# Patient Record
Sex: FEMALE | Race: WHITE | HISPANIC OR LATINO | Employment: FULL TIME | URBAN - METROPOLITAN AREA
[De-identification: names, ages, dates, MRNs, and addresses within clinical notes are randomized per-mention and may not be internally consistent; named-entity substitution may affect disease eponyms.]

---

## 2017-01-05 ENCOUNTER — ALLSCRIPTS OFFICE VISIT (OUTPATIENT)
Dept: OTHER | Facility: OTHER | Age: 52
End: 2017-01-05

## 2017-02-08 ENCOUNTER — TRANSCRIBE ORDERS (OUTPATIENT)
Dept: ADMINISTRATIVE | Facility: HOSPITAL | Age: 52
End: 2017-02-08

## 2017-02-08 DIAGNOSIS — Z12.31 SCREENING MAMMOGRAM FOR HIGH-RISK PATIENT: Primary | ICD-10-CM

## 2017-02-16 ENCOUNTER — ALLSCRIPTS OFFICE VISIT (OUTPATIENT)
Dept: OTHER | Facility: OTHER | Age: 52
End: 2017-02-16

## 2017-02-16 DIAGNOSIS — M75.101 RIGHT ROTATOR CUFF TEAR: ICD-10-CM

## 2017-02-16 DIAGNOSIS — M75.111 INCOMPLETE TEAR OF RIGHT ROTATOR CUFF: ICD-10-CM

## 2017-02-16 DIAGNOSIS — M25.511 PAIN IN RIGHT SHOULDER: ICD-10-CM

## 2017-02-21 ENCOUNTER — TRANSCRIBE ORDERS (OUTPATIENT)
Dept: ADMINISTRATIVE | Facility: HOSPITAL | Age: 52
End: 2017-02-21

## 2017-02-21 DIAGNOSIS — M75.101 TEAR OF RIGHT ROTATOR CUFF, UNSPECIFIED TEAR EXTENT: Primary | ICD-10-CM

## 2017-02-24 ENCOUNTER — ALLSCRIPTS OFFICE VISIT (OUTPATIENT)
Dept: OTHER | Facility: OTHER | Age: 52
End: 2017-02-24

## 2017-02-24 ENCOUNTER — HOSPITAL ENCOUNTER (OUTPATIENT)
Dept: RADIOLOGY | Age: 52
Discharge: HOME | End: 2017-02-24
Payer: COMMERCIAL

## 2017-02-24 DIAGNOSIS — M75.101 RIGHT ROTATOR CUFF TEAR: ICD-10-CM

## 2017-02-24 PROCEDURE — 73221 MRI JOINT UPR EXTREM W/O DYE: CPT

## 2017-03-06 ENCOUNTER — ALLSCRIPTS OFFICE VISIT (OUTPATIENT)
Dept: OTHER | Facility: OTHER | Age: 52
End: 2017-03-06

## 2017-03-12 ENCOUNTER — HOSPITAL ENCOUNTER (EMERGENCY)
Facility: HOSPITAL | Age: 52
Discharge: HOME | End: 2017-03-12
Attending: EMERGENCY MEDICINE | Admitting: EMERGENCY MEDICINE
Payer: COMMERCIAL

## 2017-03-12 VITALS
BODY MASS INDEX: 36.17 KG/M2 | SYSTOLIC BLOOD PRESSURE: 126 MMHG | OXYGEN SATURATION: 99 % | HEART RATE: 76 BPM | DIASTOLIC BLOOD PRESSURE: 72 MMHG | WEIGHT: 185.19 LBS | RESPIRATION RATE: 20 BRPM | TEMPERATURE: 97.2 F

## 2017-03-12 DIAGNOSIS — G43.909 MIGRAINE: Primary | ICD-10-CM

## 2017-03-12 PROCEDURE — 96375 TX/PRO/DX INJ NEW DRUG ADDON: CPT

## 2017-03-12 PROCEDURE — 96361 HYDRATE IV INFUSION ADD-ON: CPT

## 2017-03-12 PROCEDURE — 99283 EMERGENCY DEPT VISIT LOW MDM: CPT

## 2017-03-12 PROCEDURE — 96374 THER/PROPH/DIAG INJ IV PUSH: CPT

## 2017-03-12 RX ORDER — KETOROLAC TROMETHAMINE 30 MG/ML
30 INJECTION, SOLUTION INTRAMUSCULAR; INTRAVENOUS ONCE
Status: COMPLETED | OUTPATIENT
Start: 2017-03-12 | End: 2017-03-12

## 2017-03-12 RX ORDER — DIPHENHYDRAMINE HYDROCHLORIDE 50 MG/ML
25 INJECTION INTRAMUSCULAR; INTRAVENOUS ONCE
Status: COMPLETED | OUTPATIENT
Start: 2017-03-12 | End: 2017-03-12

## 2017-03-12 RX ORDER — METOCLOPRAMIDE 10 MG/1
10 TABLET ORAL 4 TIMES DAILY PRN
Qty: 20 TABLET | Refills: 0 | Status: SHIPPED | OUTPATIENT
Start: 2017-03-12 | End: 2017-03-17

## 2017-03-12 RX ORDER — METOCLOPRAMIDE HYDROCHLORIDE 5 MG/ML
10 INJECTION INTRAMUSCULAR; INTRAVENOUS ONCE
Status: COMPLETED | OUTPATIENT
Start: 2017-03-12 | End: 2017-03-12

## 2017-03-12 RX ORDER — TOPIRAMATE 50 MG/1
50 TABLET, FILM COATED ORAL 2 TIMES DAILY
COMMUNITY
End: 2017-09-23 | Stop reason: ALTCHOICE

## 2017-03-12 RX ADMIN — METOCLOPRAMIDE 10 MG: 5 INJECTION, SOLUTION INTRAMUSCULAR; INTRAVENOUS at 11:31

## 2017-03-12 RX ADMIN — DIPHENHYDRAMINE HYDROCHLORIDE 25 MG: 50 INJECTION, SOLUTION INTRAMUSCULAR; INTRAVENOUS at 11:33

## 2017-03-12 RX ADMIN — SODIUM CHLORIDE 1000 ML: 0.9 INJECTION, SOLUTION INTRAVENOUS at 11:30

## 2017-03-12 RX ADMIN — KETOROLAC TROMETHAMINE 30 MG: 30 INJECTION, SOLUTION INTRAMUSCULAR at 11:32

## 2017-03-29 ENCOUNTER — ALLSCRIPTS OFFICE VISIT (OUTPATIENT)
Dept: OTHER | Facility: OTHER | Age: 52
End: 2017-03-29

## 2017-03-31 ENCOUNTER — GENERIC CONVERSION - ENCOUNTER (OUTPATIENT)
Dept: OTHER | Facility: OTHER | Age: 52
End: 2017-03-31

## 2017-04-18 ENCOUNTER — TRANSCRIBE ORDERS (OUTPATIENT)
Dept: ADMINISTRATIVE | Facility: HOSPITAL | Age: 52
End: 2017-04-18

## 2017-04-18 DIAGNOSIS — J01.91 ACUTE RECURRENT SINUSITIS, UNSPECIFIED LOCATION: Primary | ICD-10-CM

## 2017-04-28 ENCOUNTER — HOSPITAL ENCOUNTER (OUTPATIENT)
Dept: RADIOLOGY | Facility: HOSPITAL | Age: 52
Discharge: HOME | End: 2017-04-28
Attending: OTOLARYNGOLOGY
Payer: COMMERCIAL

## 2017-04-28 DIAGNOSIS — J01.91 ACUTE RECURRENT SINUSITIS, UNSPECIFIED LOCATION: ICD-10-CM

## 2017-04-28 PROCEDURE — 70486 CT MAXILLOFACIAL W/O DYE: CPT

## 2017-05-01 ENCOUNTER — GENERIC CONVERSION - ENCOUNTER (OUTPATIENT)
Dept: OTHER | Facility: OTHER | Age: 52
End: 2017-05-01

## 2017-05-01 ENCOUNTER — HOSPITAL ENCOUNTER (OUTPATIENT)
Dept: MAMMOGRAPHY | Facility: CLINIC | Age: 52
Discharge: HOME | End: 2017-05-01
Payer: COMMERCIAL

## 2017-05-01 ENCOUNTER — HOSPITAL ENCOUNTER (OUTPATIENT)
Dept: ULTRASOUND IMAGING | Facility: CLINIC | Age: 52
Discharge: HOME | End: 2017-05-01
Payer: COMMERCIAL

## 2017-05-01 DIAGNOSIS — R92.8 OTHER ABNORMAL AND INCONCLUSIVE FINDINGS ON DIAGNOSTIC IMAGING OF BREAST: ICD-10-CM

## 2017-05-01 DIAGNOSIS — Z12.31 ENCOUNTER FOR SCREENING MAMMOGRAM FOR MALIGNANT NEOPLASM OF BREAST: ICD-10-CM

## 2017-05-01 PROCEDURE — G0202 SCR MAMMO BI INCL CAD: HCPCS

## 2017-05-02 ENCOUNTER — GENERIC CONVERSION - ENCOUNTER (OUTPATIENT)
Dept: OTHER | Facility: OTHER | Age: 52
End: 2017-05-02

## 2017-05-09 ENCOUNTER — GENERIC CONVERSION - ENCOUNTER (OUTPATIENT)
Dept: OTHER | Facility: OTHER | Age: 52
End: 2017-05-09

## 2017-05-17 ENCOUNTER — ALLSCRIPTS OFFICE VISIT (OUTPATIENT)
Dept: OTHER | Facility: OTHER | Age: 52
End: 2017-05-17

## 2017-05-17 DIAGNOSIS — Z13.1 ENCOUNTER FOR SCREENING FOR DIABETES MELLITUS: ICD-10-CM

## 2017-05-17 DIAGNOSIS — M79.605 PAIN OF LEFT LEG: ICD-10-CM

## 2017-05-17 DIAGNOSIS — Z13.220 ENCOUNTER FOR SCREENING FOR LIPOID DISORDERS: ICD-10-CM

## 2017-05-18 ENCOUNTER — TRANSCRIBE ORDERS (OUTPATIENT)
Dept: ADMINISTRATIVE | Facility: HOSPITAL | Age: 52
End: 2017-05-18

## 2017-05-18 ENCOUNTER — HOSPITAL ENCOUNTER (OUTPATIENT)
Dept: ULTRASOUND IMAGING | Facility: HOSPITAL | Age: 52
Discharge: HOME | End: 2017-05-18
Payer: COMMERCIAL

## 2017-05-18 ENCOUNTER — GENERIC CONVERSION - ENCOUNTER (OUTPATIENT)
Dept: OTHER | Facility: OTHER | Age: 52
End: 2017-05-18

## 2017-05-18 DIAGNOSIS — M79.605 PAIN OF LEFT LEG: ICD-10-CM

## 2017-05-18 PROCEDURE — 93971 EXTREMITY STUDY: CPT

## 2017-06-03 ENCOUNTER — APPOINTMENT (OUTPATIENT)
Dept: LAB | Age: 52
End: 2017-06-03
Payer: COMMERCIAL

## 2017-06-03 DIAGNOSIS — Z13.220 ENCOUNTER FOR SCREENING FOR LIPOID DISORDERS: ICD-10-CM

## 2017-06-03 DIAGNOSIS — Z13.1 ENCOUNTER FOR SCREENING FOR DIABETES MELLITUS: ICD-10-CM

## 2017-06-03 LAB
CHOLEST SERPL-MCNC: 170 MG/DL (ref 50–200)
EST. AVERAGE GLUCOSE BLD GHB EST-MCNC: 123 MG/DL
HBA1C MFR BLD: 5.9 % (ref 4.2–6.3)
HDLC SERPL-MCNC: 50 MG/DL (ref 40–60)
LDLC SERPL CALC-MCNC: 92 MG/DL (ref 0–100)
TRIGL SERPL-MCNC: 141 MG/DL

## 2017-06-03 PROCEDURE — 83036 HEMOGLOBIN GLYCOSYLATED A1C: CPT

## 2017-06-03 PROCEDURE — 36415 COLL VENOUS BLD VENIPUNCTURE: CPT

## 2017-06-03 PROCEDURE — 80061 LIPID PANEL: CPT

## 2017-06-05 ENCOUNTER — GENERIC CONVERSION - ENCOUNTER (OUTPATIENT)
Dept: OTHER | Facility: OTHER | Age: 52
End: 2017-06-05

## 2017-07-27 ENCOUNTER — ALLSCRIPTS OFFICE VISIT (OUTPATIENT)
Dept: OTHER | Facility: OTHER | Age: 52
End: 2017-07-27

## 2017-08-23 ENCOUNTER — ALLSCRIPTS OFFICE VISIT (OUTPATIENT)
Dept: OTHER | Facility: OTHER | Age: 52
End: 2017-08-23

## 2017-08-25 ENCOUNTER — APPOINTMENT (OUTPATIENT)
Dept: LAB | Facility: CLINIC | Age: 52
End: 2017-08-25
Payer: COMMERCIAL

## 2017-08-25 DIAGNOSIS — G43.709 CHRONIC MIGRAINE WITHOUT AURA WITHOUT STATUS MIGRAINOSUS, NOT INTRACTABLE: ICD-10-CM

## 2017-08-25 DIAGNOSIS — M32.9 SYSTEMIC LUPUS ERYTHEMATOSUS (HCC): ICD-10-CM

## 2017-08-25 DIAGNOSIS — R53.83 OTHER FATIGUE: ICD-10-CM

## 2017-08-25 DIAGNOSIS — Z79.899 OTHER LONG TERM (CURRENT) DRUG THERAPY: ICD-10-CM

## 2017-08-25 DIAGNOSIS — M79.10 MYALGIA: ICD-10-CM

## 2017-08-25 DIAGNOSIS — M54.12 RADICULOPATHY OF CERVICAL REGION: ICD-10-CM

## 2017-08-25 LAB
ALBUMIN SERPL BCP-MCNC: 3.4 G/DL (ref 3.5–5)
ALP SERPL-CCNC: 104 U/L (ref 46–116)
ALT SERPL W P-5'-P-CCNC: 44 U/L (ref 12–78)
ANION GAP SERPL CALCULATED.3IONS-SCNC: 7 MMOL/L (ref 4–13)
AST SERPL W P-5'-P-CCNC: 18 U/L (ref 5–45)
BASOPHILS # BLD AUTO: 0.02 THOUSANDS/ΜL (ref 0–0.1)
BASOPHILS NFR BLD AUTO: 0 % (ref 0–1)
BILIRUB SERPL-MCNC: 0.25 MG/DL (ref 0.2–1)
BUN SERPL-MCNC: 18 MG/DL (ref 5–25)
C3 SERPL-MCNC: 112 MG/DL (ref 90–180)
C4 SERPL-MCNC: 31 MG/DL (ref 10–40)
CALCIUM SERPL-MCNC: 8.9 MG/DL (ref 8.3–10.1)
CHLORIDE SERPL-SCNC: 105 MMOL/L (ref 100–108)
CO2 SERPL-SCNC: 27 MMOL/L (ref 21–32)
CREAT SERPL-MCNC: 0.87 MG/DL (ref 0.6–1.3)
CRP SERPL QL: 5 MG/L
EOSINOPHIL # BLD AUTO: 0.12 THOUSAND/ΜL (ref 0–0.61)
EOSINOPHIL NFR BLD AUTO: 2 % (ref 0–6)
ERYTHROCYTE [DISTWIDTH] IN BLOOD BY AUTOMATED COUNT: 14.6 % (ref 11.6–15.1)
ERYTHROCYTE [SEDIMENTATION RATE] IN BLOOD: 18 MM/HOUR (ref 0–20)
GFR SERPL CREATININE-BSD FRML MDRD: 77 ML/MIN/1.73SQ M
GLUCOSE SERPL-MCNC: 105 MG/DL (ref 65–140)
HCT VFR BLD AUTO: 41 % (ref 34.8–46.1)
HGB BLD-MCNC: 13.5 G/DL (ref 11.5–15.4)
LYMPHOCYTES # BLD AUTO: 1.83 THOUSANDS/ΜL (ref 0.6–4.47)
LYMPHOCYTES NFR BLD AUTO: 34 % (ref 14–44)
MCH RBC QN AUTO: 29.1 PG (ref 26.8–34.3)
MCHC RBC AUTO-ENTMCNC: 32.9 G/DL (ref 31.4–37.4)
MCV RBC AUTO: 88 FL (ref 82–98)
MONOCYTES # BLD AUTO: 0.46 THOUSAND/ΜL (ref 0.17–1.22)
MONOCYTES NFR BLD AUTO: 9 % (ref 4–12)
NEUTROPHILS # BLD AUTO: 2.88 THOUSANDS/ΜL (ref 1.85–7.62)
NEUTS SEG NFR BLD AUTO: 55 % (ref 43–75)
NRBC BLD AUTO-RTO: 0 /100 WBCS
PLATELET # BLD AUTO: 196 THOUSANDS/UL (ref 149–390)
PMV BLD AUTO: 11.4 FL (ref 8.9–12.7)
POTASSIUM SERPL-SCNC: 3.7 MMOL/L (ref 3.5–5.3)
PROT SERPL-MCNC: 7 G/DL (ref 6.4–8.2)
RBC # BLD AUTO: 4.64 MILLION/UL (ref 3.81–5.12)
SODIUM SERPL-SCNC: 139 MMOL/L (ref 136–145)
TSH SERPL DL<=0.05 MIU/L-ACNC: 1.6 UIU/ML (ref 0.36–3.74)
WBC # BLD AUTO: 5.33 THOUSAND/UL (ref 4.31–10.16)

## 2017-08-25 PROCEDURE — 86140 C-REACTIVE PROTEIN: CPT

## 2017-08-25 PROCEDURE — 86160 COMPLEMENT ANTIGEN: CPT

## 2017-08-25 PROCEDURE — 85652 RBC SED RATE AUTOMATED: CPT

## 2017-08-25 PROCEDURE — 85025 COMPLETE CBC W/AUTO DIFF WBC: CPT

## 2017-08-25 PROCEDURE — 84443 ASSAY THYROID STIM HORMONE: CPT

## 2017-08-25 PROCEDURE — 86225 DNA ANTIBODY NATIVE: CPT

## 2017-08-25 PROCEDURE — 80053 COMPREHEN METABOLIC PANEL: CPT

## 2017-08-25 PROCEDURE — 86617 LYME DISEASE ANTIBODY: CPT

## 2017-08-25 PROCEDURE — 36415 COLL VENOUS BLD VENIPUNCTURE: CPT

## 2017-08-26 LAB

## 2017-08-27 ENCOUNTER — HOSPITAL ENCOUNTER (EMERGENCY)
Facility: HOSPITAL | Age: 52
Discharge: HOME | End: 2017-08-27
Attending: EMERGENCY MEDICINE | Admitting: EMERGENCY MEDICINE
Payer: COMMERCIAL

## 2017-08-27 VITALS
OXYGEN SATURATION: 96 % | SYSTOLIC BLOOD PRESSURE: 136 MMHG | HEART RATE: 78 BPM | TEMPERATURE: 97.4 F | DIASTOLIC BLOOD PRESSURE: 78 MMHG | WEIGHT: 180 LBS | RESPIRATION RATE: 18 BRPM | BODY MASS INDEX: 35.15 KG/M2

## 2017-08-27 DIAGNOSIS — G43.909 MIGRAINE: Primary | ICD-10-CM

## 2017-08-27 LAB
ATRIAL RATE: 66 BPM
P AXIS: 36 DEGREES
PR INTERVAL: 186 MS
QRS AXIS: -9 DEGREES
QRSD INTERVAL: 86 MS
QT INTERVAL: 424 MS
QTC INTERVAL: 444 MS
T WAVE AXIS: 22 DEGREES
VENTRICULAR RATE: 66 BPM

## 2017-08-27 PROCEDURE — 96374 THER/PROPH/DIAG INJ IV PUSH: CPT

## 2017-08-27 PROCEDURE — 93005 ELECTROCARDIOGRAM TRACING: CPT

## 2017-08-27 PROCEDURE — 96375 TX/PRO/DX INJ NEW DRUG ADDON: CPT

## 2017-08-27 PROCEDURE — 96361 HYDRATE IV INFUSION ADD-ON: CPT

## 2017-08-27 PROCEDURE — 99283 EMERGENCY DEPT VISIT LOW MDM: CPT

## 2017-08-27 RX ORDER — KETOROLAC TROMETHAMINE 30 MG/ML
15 INJECTION, SOLUTION INTRAMUSCULAR; INTRAVENOUS ONCE
Status: COMPLETED | OUTPATIENT
Start: 2017-08-27 | End: 2017-08-27

## 2017-08-27 RX ORDER — HYDROCHLOROTHIAZIDE 25 MG/1
25 TABLET ORAL DAILY
COMMUNITY
End: 2018-01-26 | Stop reason: ALTCHOICE

## 2017-08-27 RX ORDER — KETOROLAC TROMETHAMINE 10 MG/1
10 TABLET, FILM COATED ORAL EVERY 6 HOURS PRN
COMMUNITY
End: 2018-01-26 | Stop reason: ALTCHOICE

## 2017-08-27 RX ORDER — METOCLOPRAMIDE HYDROCHLORIDE 5 MG/ML
10 INJECTION INTRAMUSCULAR; INTRAVENOUS ONCE
Status: COMPLETED | OUTPATIENT
Start: 2017-08-27 | End: 2017-08-27

## 2017-08-27 RX ORDER — DIPHENHYDRAMINE HCL 25 MG
25 TABLET ORAL ONCE
Status: DISCONTINUED | OUTPATIENT
Start: 2017-08-27 | End: 2017-08-27

## 2017-08-27 RX ORDER — DIPHENHYDRAMINE HYDROCHLORIDE 50 MG/ML
25 INJECTION INTRAMUSCULAR; INTRAVENOUS ONCE
Status: COMPLETED | OUTPATIENT
Start: 2017-08-27 | End: 2017-08-27

## 2017-08-27 RX ORDER — DIPHENHYDRAMINE HYDROCHLORIDE 50 MG/ML
INJECTION INTRAMUSCULAR; INTRAVENOUS
Status: COMPLETED
Start: 2017-08-27 | End: 2017-08-27

## 2017-08-27 RX ORDER — SUMATRIPTAN 25 MG/1
25 TABLET, FILM COATED ORAL ONCE AS NEEDED
COMMUNITY
End: 2018-01-26 | Stop reason: ALTCHOICE

## 2017-08-27 RX ADMIN — SODIUM CHLORIDE 1000 ML: 0.9 INJECTION, SOLUTION INTRAVENOUS at 12:37

## 2017-08-27 RX ADMIN — DIPHENHYDRAMINE HYDROCHLORIDE 25 MG: 50 INJECTION INTRAMUSCULAR; INTRAVENOUS at 12:38

## 2017-08-27 RX ADMIN — DIPHENHYDRAMINE HYDROCHLORIDE 25 MG: 50 INJECTION, SOLUTION INTRAMUSCULAR; INTRAVENOUS at 12:38

## 2017-08-27 RX ADMIN — METOCLOPRAMIDE 10 MG: 5 INJECTION, SOLUTION INTRAMUSCULAR; INTRAVENOUS at 12:37

## 2017-08-27 RX ADMIN — KETOROLAC TROMETHAMINE 15 MG: 30 INJECTION, SOLUTION INTRAMUSCULAR at 12:37

## 2017-08-28 ENCOUNTER — GENERIC CONVERSION - ENCOUNTER (OUTPATIENT)
Dept: OTHER | Facility: OTHER | Age: 52
End: 2017-08-28

## 2017-09-23 ENCOUNTER — HOSPITAL ENCOUNTER (EMERGENCY)
Facility: HOSPITAL | Age: 52
Discharge: HOME | End: 2017-09-23
Attending: EMERGENCY MEDICINE
Payer: COMMERCIAL

## 2017-09-23 VITALS
SYSTOLIC BLOOD PRESSURE: 137 MMHG | OXYGEN SATURATION: 99 % | TEMPERATURE: 97.3 F | RESPIRATION RATE: 18 BRPM | WEIGHT: 186.51 LBS | BODY MASS INDEX: 36.43 KG/M2 | HEART RATE: 70 BPM | DIASTOLIC BLOOD PRESSURE: 83 MMHG

## 2017-09-23 DIAGNOSIS — R51.9 RECURRENT HEADACHE: Primary | ICD-10-CM

## 2017-09-23 PROCEDURE — 96361 HYDRATE IV INFUSION ADD-ON: CPT

## 2017-09-23 PROCEDURE — 96374 THER/PROPH/DIAG INJ IV PUSH: CPT

## 2017-09-23 PROCEDURE — 99283 EMERGENCY DEPT VISIT LOW MDM: CPT

## 2017-09-23 PROCEDURE — 96375 TX/PRO/DX INJ NEW DRUG ADDON: CPT

## 2017-09-23 RX ORDER — KETOROLAC TROMETHAMINE 30 MG/ML
15 INJECTION, SOLUTION INTRAMUSCULAR; INTRAVENOUS ONCE
Status: COMPLETED | OUTPATIENT
Start: 2017-09-23 | End: 2017-09-23

## 2017-09-23 RX ORDER — METOCLOPRAMIDE HYDROCHLORIDE 5 MG/ML
10 INJECTION INTRAMUSCULAR; INTRAVENOUS ONCE
Status: COMPLETED | OUTPATIENT
Start: 2017-09-23 | End: 2017-09-23

## 2017-09-23 RX ORDER — DIPHENHYDRAMINE HYDROCHLORIDE 50 MG/ML
25 INJECTION INTRAMUSCULAR; INTRAVENOUS ONCE
Status: COMPLETED | OUTPATIENT
Start: 2017-09-23 | End: 2017-09-23

## 2017-09-23 RX ADMIN — DIPHENHYDRAMINE HYDROCHLORIDE 25 MG: 50 INJECTION, SOLUTION INTRAMUSCULAR; INTRAVENOUS at 12:43

## 2017-09-23 RX ADMIN — METOCLOPRAMIDE 10 MG: 5 INJECTION, SOLUTION INTRAMUSCULAR; INTRAVENOUS at 12:44

## 2017-09-23 RX ADMIN — SODIUM CHLORIDE 500 ML: 0.9 INJECTION, SOLUTION INTRAVENOUS at 12:42

## 2017-09-23 RX ADMIN — KETOROLAC TROMETHAMINE 15 MG: 30 INJECTION, SOLUTION INTRAMUSCULAR at 12:41

## 2017-10-23 ENCOUNTER — GENERIC CONVERSION - ENCOUNTER (OUTPATIENT)
Dept: OTHER | Facility: OTHER | Age: 52
End: 2017-10-23

## 2017-10-23 ENCOUNTER — APPOINTMENT (OUTPATIENT)
Dept: LAB | Facility: HOSPITAL | Age: 52
End: 2017-10-23
Attending: INTERNAL MEDICINE
Payer: COMMERCIAL

## 2017-10-23 ENCOUNTER — ALLSCRIPTS OFFICE VISIT (OUTPATIENT)
Dept: OTHER | Facility: OTHER | Age: 52
End: 2017-10-23

## 2017-10-23 ENCOUNTER — TRANSCRIBE ORDERS (OUTPATIENT)
Dept: LAB | Facility: HOSPITAL | Age: 52
End: 2017-10-23

## 2017-10-23 DIAGNOSIS — M75.101 RIGHT ROTATOR CUFF TEAR: ICD-10-CM

## 2017-10-23 DIAGNOSIS — M32.9 SYSTEMIC LUPUS ERYTHEMATOSUS, UNSPECIFIED SLE TYPE, UNSPECIFIED ORGAN INVOLVEMENT STATUS (HCC): ICD-10-CM

## 2017-10-23 DIAGNOSIS — R60.9 EDEMA, UNSPECIFIED TYPE: ICD-10-CM

## 2017-10-23 DIAGNOSIS — M75.01 ADHESIVE CAPSULITIS OF RIGHT SHOULDER: ICD-10-CM

## 2017-10-23 DIAGNOSIS — R52 PAIN: ICD-10-CM

## 2017-10-23 DIAGNOSIS — M32.9 SYSTEMIC LUPUS ERYTHEMATOSUS, UNSPECIFIED SLE TYPE, UNSPECIFIED ORGAN INVOLVEMENT STATUS (HCC): Primary | ICD-10-CM

## 2017-10-23 LAB
ALBUMIN SERPL BCP-MCNC: 3.9 G/DL (ref 3.5–5)
ALP SERPL-CCNC: 109 U/L (ref 46–116)
ALT SERPL W P-5'-P-CCNC: 78 U/L (ref 12–78)
ANION GAP SERPL CALCULATED.3IONS-SCNC: 6 MMOL/L (ref 4–13)
AST SERPL W P-5'-P-CCNC: 49 U/L (ref 5–45)
BASOPHILS # BLD AUTO: 0.02 THOUSANDS/ΜL (ref 0–0.1)
BASOPHILS NFR BLD AUTO: 1 % (ref 0–1)
BILIRUB SERPL-MCNC: 0.36 MG/DL (ref 0.2–1)
BILIRUB UR QL STRIP: NEGATIVE
BUN SERPL-MCNC: 15 MG/DL (ref 5–25)
C3 SERPL-MCNC: 137 MG/DL (ref 90–180)
C4 SERPL-MCNC: 36 MG/DL (ref 10–40)
CALCIUM SERPL-MCNC: 9.2 MG/DL (ref 8.3–10.1)
CHLORIDE SERPL-SCNC: 105 MMOL/L (ref 100–108)
CLARITY UR: CLEAR
CO2 SERPL-SCNC: 27 MMOL/L (ref 21–32)
COLOR UR: YELLOW
CREAT SERPL-MCNC: 0.81 MG/DL (ref 0.6–1.3)
CRP SERPL QL: 5.1 MG/L
EOSINOPHIL # BLD AUTO: 0.01 THOUSAND/ΜL (ref 0–0.61)
EOSINOPHIL NFR BLD AUTO: 0 % (ref 0–6)
ERYTHROCYTE [DISTWIDTH] IN BLOOD BY AUTOMATED COUNT: 14.6 % (ref 11.6–15.1)
ERYTHROCYTE [SEDIMENTATION RATE] IN BLOOD: 11 MM/HOUR (ref 0–20)
GFR SERPL CREATININE-BSD FRML MDRD: 84 ML/MIN/1.73SQ M
GLUCOSE SERPL-MCNC: 83 MG/DL (ref 65–140)
GLUCOSE UR STRIP-MCNC: NEGATIVE MG/DL
HCT VFR BLD AUTO: 45.1 % (ref 34.8–46.1)
HGB BLD-MCNC: 14.9 G/DL (ref 11.5–15.4)
HGB UR QL STRIP.AUTO: NEGATIVE
KETONES UR STRIP-MCNC: NEGATIVE MG/DL
LEUKOCYTE ESTERASE UR QL STRIP: NEGATIVE
LYMPHOCYTES # BLD AUTO: 1.24 THOUSANDS/ΜL (ref 0.6–4.47)
LYMPHOCYTES NFR BLD AUTO: 38 % (ref 14–44)
MCH RBC QN AUTO: 28.8 PG (ref 26.8–34.3)
MCHC RBC AUTO-ENTMCNC: 33 G/DL (ref 31.4–37.4)
MCV RBC AUTO: 87 FL (ref 82–98)
MONOCYTES # BLD AUTO: 0.24 THOUSAND/ΜL (ref 0.17–1.22)
MONOCYTES NFR BLD AUTO: 7 % (ref 4–12)
NEUTROPHILS # BLD AUTO: 1.72 THOUSANDS/ΜL (ref 1.85–7.62)
NEUTS SEG NFR BLD AUTO: 54 % (ref 43–75)
NITRITE UR QL STRIP: NEGATIVE
NRBC BLD AUTO-RTO: 0 /100 WBCS
PH UR STRIP.AUTO: 6 [PH] (ref 4.5–8)
PLATELET # BLD AUTO: 190 THOUSANDS/UL (ref 149–390)
PMV BLD AUTO: 10.5 FL (ref 8.9–12.7)
POTASSIUM SERPL-SCNC: 3.4 MMOL/L (ref 3.5–5.3)
PROT SERPL-MCNC: 8.4 G/DL (ref 6.4–8.2)
PROT UR STRIP-MCNC: NEGATIVE MG/DL
RBC # BLD AUTO: 5.18 MILLION/UL (ref 3.81–5.12)
SODIUM SERPL-SCNC: 138 MMOL/L (ref 136–145)
SP GR UR STRIP.AUTO: 1.02 (ref 1–1.03)
T4 FREE SERPL-MCNC: 1.02 NG/DL (ref 0.76–1.46)
TSH SERPL DL<=0.05 MIU/L-ACNC: 4.45 UIU/ML (ref 0.36–3.74)
UROBILINOGEN UR QL STRIP.AUTO: 0.2 E.U./DL
WBC # BLD AUTO: 3.24 THOUSAND/UL (ref 4.31–10.16)

## 2017-10-23 PROCEDURE — 85613 RUSSELL VIPER VENOM DILUTED: CPT

## 2017-10-23 PROCEDURE — 80053 COMPREHEN METABOLIC PANEL: CPT

## 2017-10-23 PROCEDURE — 81003 URINALYSIS AUTO W/O SCOPE: CPT | Performed by: INTERNAL MEDICINE

## 2017-10-23 PROCEDURE — 86146 BETA-2 GLYCOPROTEIN ANTIBODY: CPT

## 2017-10-23 PROCEDURE — 86430 RHEUMATOID FACTOR TEST QUAL: CPT

## 2017-10-23 PROCEDURE — 85652 RBC SED RATE AUTOMATED: CPT

## 2017-10-23 PROCEDURE — 85598 HEXAGNAL PHOSPH PLTLT NEUTRL: CPT

## 2017-10-23 PROCEDURE — 86235 NUCLEAR ANTIGEN ANTIBODY: CPT

## 2017-10-23 PROCEDURE — 85705 THROMBOPLASTIN INHIBITION: CPT

## 2017-10-23 PROCEDURE — 86140 C-REACTIVE PROTEIN: CPT

## 2017-10-23 PROCEDURE — 86039 ANTINUCLEAR ANTIBODIES (ANA): CPT

## 2017-10-23 PROCEDURE — 86147 CARDIOLIPIN ANTIBODY EA IG: CPT

## 2017-10-23 PROCEDURE — 84439 ASSAY OF FREE THYROXINE: CPT

## 2017-10-23 PROCEDURE — 36415 COLL VENOUS BLD VENIPUNCTURE: CPT

## 2017-10-23 PROCEDURE — 84443 ASSAY THYROID STIM HORMONE: CPT

## 2017-10-23 PROCEDURE — 86160 COMPLEMENT ANTIGEN: CPT

## 2017-10-23 PROCEDURE — 86038 ANTINUCLEAR ANTIBODIES: CPT

## 2017-10-23 PROCEDURE — 86225 DNA ANTIBODY NATIVE: CPT

## 2017-10-23 PROCEDURE — 85732 THROMBOPLASTIN TIME PARTIAL: CPT

## 2017-10-23 PROCEDURE — 85670 THROMBIN TIME PLASMA: CPT

## 2017-10-23 PROCEDURE — 85025 COMPLETE CBC W/AUTO DIFF WBC: CPT

## 2017-10-23 PROCEDURE — 80074 ACUTE HEPATITIS PANEL: CPT

## 2017-10-23 PROCEDURE — 86200 CCP ANTIBODY: CPT

## 2017-10-23 PROCEDURE — 84165 PROTEIN E-PHORESIS SERUM: CPT

## 2017-10-24 LAB
CARDIOLIPIN IGA SER IA-ACNC: <9 APL U/ML (ref 0–11)
CARDIOLIPIN IGG SER IA-ACNC: <9 GPL U/ML (ref 0–14)
CARDIOLIPIN IGM SER IA-ACNC: 36 MPL U/ML (ref 0–12)
DSDNA AB SER-ACNC: 16 IU/ML (ref 0–9)
ENA RNP AB SER-ACNC: <0.2 AI (ref 0–0.9)
ENA SM AB SER-ACNC: <0.2 AI (ref 0–0.9)
ENA SS-A AB SER-ACNC: <0.2 AI (ref 0–0.9)
ENA SS-B AB SER-ACNC: <0.2 AI (ref 0–0.9)
HAV IGM SER QL: NORMAL
HBV CORE IGM SER QL: NORMAL
HBV SURFACE AG SER QL: NORMAL
HCV AB SER QL: NORMAL
RHEUMATOID FACT SER QL LA: NEGATIVE

## 2017-10-25 LAB
ANA HOMOGEN SER QL IF: NORMAL
ANA HOMOGEN TITR SER: NORMAL {TITER}
B2 GLYCOPROT1 IGA SER-ACNC: <9 GPI IGA UNITS (ref 0–25)
B2 GLYCOPROT1 IGG SER-ACNC: <9 GPI IGG UNITS (ref 0–20)
B2 GLYCOPROT1 IGM SER-ACNC: <9 GPI IGM UNITS (ref 0–32)
CCP IGA+IGG SERPL IA-ACNC: 4 UNITS (ref 0–19)
RYE IGE QN: POSITIVE

## 2017-10-26 LAB
ALBUMIN SERPL ELPH-MCNC: 4.59 G/DL (ref 3.5–5)
ALBUMIN SERPL ELPH-MCNC: 58.9 % (ref 52–65)
ALPHA1 GLOB SERPL ELPH-MCNC: 0.36 G/DL (ref 0.1–0.4)
ALPHA1 GLOB SERPL ELPH-MCNC: 4.6 % (ref 2.5–5)
ALPHA2 GLOB SERPL ELPH-MCNC: 0.79 G/DL (ref 0.4–1.2)
ALPHA2 GLOB SERPL ELPH-MCNC: 10.1 % (ref 7–13)
APTT HEX PL PPP: 5 SEC (ref 0–11)
APTT SCREEN TO CONFIRM RATIO: 0.8 RATIO (ref 0–1.4)
APTT-LA IMM 4:1 NP PPP: 49.6 SEC (ref 0–48.9)
BETA GLOB ABNORMAL SERPL ELPH-MCNC: 0.44 G/DL (ref 0.4–0.8)
BETA1 GLOB SERPL ELPH-MCNC: 5.7 % (ref 5–13)
BETA2 GLOB SERPL ELPH-MCNC: 4.6 % (ref 2–8)
BETA2+GAMMA GLOB SERPL ELPH-MCNC: 0.36 G/DL (ref 0.2–0.5)
CONFIRM APTT/NORMAL: 55.5 SEC (ref 0–55)
DRVVT IMM 1:2 NP PPP: 42.7 SEC (ref 0–47)
GAMMA GLOB ABNORMAL SERPL ELPH-MCNC: 1.26 G/DL (ref 0.5–1.6)
GAMMA GLOB SERPL ELPH-MCNC: 16.1 % (ref 12–22)
IGG/ALB SER: 1.43 {RATIO} (ref 1.1–1.8)
LA PPP-IMP: ABNORMAL
PROT PATTERN SERPL ELPH-IMP: NORMAL
PROT SERPL-MCNC: 7.8 G/DL (ref 6.4–8.2)
SCREEN APTT: 55.3 SEC (ref 0–51.9)
SCREEN DRVVT: 54.6 SEC (ref 0–47)
THROMBIN TIME: 21.7 SEC (ref 0–23)

## 2017-10-31 ENCOUNTER — APPOINTMENT (OUTPATIENT)
Dept: PHYSICAL THERAPY | Facility: REHABILITATION | Age: 52
End: 2017-10-31
Payer: COMMERCIAL

## 2017-10-31 ENCOUNTER — GENERIC CONVERSION - ENCOUNTER (OUTPATIENT)
Dept: OTHER | Facility: OTHER | Age: 52
End: 2017-10-31

## 2017-10-31 DIAGNOSIS — M75.101 RIGHT ROTATOR CUFF TEAR: ICD-10-CM

## 2017-10-31 DIAGNOSIS — M75.01 ADHESIVE CAPSULITIS OF RIGHT SHOULDER: ICD-10-CM

## 2017-10-31 PROCEDURE — G8990 OTHER PT/OT CURRENT STATUS: HCPCS

## 2017-10-31 PROCEDURE — 97161 PT EVAL LOW COMPLEX 20 MIN: CPT

## 2017-10-31 PROCEDURE — G8991 OTHER PT/OT GOAL STATUS: HCPCS

## 2017-10-31 PROCEDURE — 97110 THERAPEUTIC EXERCISES: CPT

## 2017-11-17 ENCOUNTER — HOSPITAL ENCOUNTER (OUTPATIENT)
Dept: NON INVASIVE DIAGNOSTICS | Facility: HOSPITAL | Age: 52
Discharge: HOME | End: 2017-11-17
Attending: INTERNAL MEDICINE
Payer: COMMERCIAL

## 2017-11-17 DIAGNOSIS — R52 PAIN: ICD-10-CM

## 2017-11-17 DIAGNOSIS — M32.9 SYSTEMIC LUPUS ERYTHEMATOSUS, UNSPECIFIED SLE TYPE, UNSPECIFIED ORGAN INVOLVEMENT STATUS (HCC): ICD-10-CM

## 2017-11-17 DIAGNOSIS — R60.9 EDEMA, UNSPECIFIED TYPE: ICD-10-CM

## 2017-11-17 PROCEDURE — 93971 EXTREMITY STUDY: CPT

## 2017-11-30 ENCOUNTER — GENERIC CONVERSION - ENCOUNTER (OUTPATIENT)
Dept: OBGYN CLINIC | Facility: OTHER | Age: 52
End: 2017-11-30

## 2017-12-01 ENCOUNTER — ALLSCRIPTS OFFICE VISIT (OUTPATIENT)
Dept: OTHER | Facility: OTHER | Age: 52
End: 2017-12-01

## 2017-12-04 ENCOUNTER — ALLSCRIPTS OFFICE VISIT (OUTPATIENT)
Dept: OTHER | Facility: OTHER | Age: 52
End: 2017-12-04

## 2017-12-05 NOTE — CONSULTS
Assessment    1  Chronic migraine (346 70) (G43 709)  2  Migraine, unspecified, not intractable, without status migrainosus (346 90) (G43 909)    Plan  Chronic migraine    · Start: Butalbital-APAP-Caffeine -40 MG Oral Capsule; TAKE 1 CAPSULE Every 6hours PRN  Rx By: Rema Patino; Dispense: 0 Days ; #:30 Capsule; Refill: 2;For: Chronic migraine; GUEVARA = N; Faxed To: \[facility]\"" [facility]Y   · Start: Propranolol HCl - 40 MG Oral Tablet; Take 1 tablet twice daily  Rx By: Rema Patino; Dispense: 30 Days ; #:60 Tablet; Refill: 3;For: Chronic migraine; GUEVARA = N; Faxed To: \[facility]\"" [facility]   · Start: Zomig 5 MG Nasal Solution; USE AS DIRECTED  Rx By: Rema Patino; Dispense: 0 Days ; #:10 Solution; Refill: 1;For: Chronic migraine; GUEVARA = N; Faxed To: [address]; Msg to Pharmacy: use 1 spray as needed at the onset of migraine, and can repeat in 4 hours but no more than 2 sprays in a day  Migraine, unspecified, not intractable, without status migrainosus    · Follow-up visit in 3 months Evaluation and Treatment  Follow-up  Status: Complete Done: [id number]  Ordered; For: Migraine, unspecified, not intractable, without status migrainosus; Ordered By: Rema Patino  Performed:   Due: [id number]; Last Updated By: Radha Polanco; 12/4/2017 9:07:59 AM    Discussion/Summary    This is a 45year old  female with history of SLE, fibromyalgia follows Rheumatology who presented to the neurology clinic with a new visit for headaches  Headaches are migrainous in nature with nausea, vomitng, throbbbing in nature, and light and sound sensitivity  She has failed topamax in the past and tried a decent dose of the medication 100mg and it did not reduce her headaches, and also caused cognitive symptoms, and being forgetful  She does not want to try this medication again  She has tried maxalt, and imitrex in the past without any success     -Will prescribe Propanolol for preventative treatment -Prescribe zomig for abortive therapy  -continue with furocet PRN for abortive therapy -Continue with Chiropractor massages which does seem to help -Recommend continued hydration and decreasing caffeine intake -follow up with me in 3 months -i have discussed with her regarding botox which might be the next option if propanolol does not seem to work  Chief Complaint  Chief Complaint Free Text Note Form: Patient present for consult appointment regarding chronic migraine      History of Present Illness  HPI:  This is a 58-year-old female who is here today in Neurology Clinic as a new patient for evaluation of her headaches  She was previous previously seen Family Medicine for management of her headaches  She has been following family medicine for history of SLE and sees rheumatology as well for SLE, fibromyalgia  She had a history of DVT in [facility] as a teenager and is on Aspirin 81mg PO qdaily for management of that  Her headache started at the age of 44s  They have worsened over the past year and a half  Intensity of her headache is 10/10 at their worst   They are located on her forehead, and her bilateral cheeks  Because of the location of her headaches she was seen by ENT and was told that she does not have any sinus headaches  Description of her pain is throbbing pulsating  Associated symptoms with her headache are nausea, vomiting, sore/stiff neck, concentration problems, prefers a quiet dark room  She is sensitive to light sound and smell  Some of the triggers for headache are stress, weather changes  She states that she has tremendous amount of nausea which is debilitating and cannot keep anything down when she has the her headaches  Rescue medications that she has tried in the past:  Benadryl which makes her sleepy but does not help  Toradol does help initially but she does have a headache that comes back  Imitrex did not seem to help, even with repetition    She has tried Maxalt as well in the past which did not help relieve her headaches  She states that Fioricet does seem to help get rid of the headache when it is severe  It does not help so much with nausea  Preventative medications:  Flexeril which helped with her neck pain but did not help with her headaches  Topamax 100 mg daily that she has tried for the past year did not help with her headaches and actually caused concentration and memory problems  She has tried massages with her chiropractor which does seem to help with her headaches  Review of Systems  Neurological ROS:  Constitutional:1  chills1 -- and-- fatigue1   HEENT:1  sinus problems1 ,-- tinnitus1 ,-- mouth sores1 -- and-- dysphagia1   Cardiovascular:1   No chest pain or pressure, no palpitations present, the heart rate was not rapid or irregular, no swelling in the arms or legs, no poor circulation1   Respiratory:1  shortness of breath with or without exertion1   Gastrointestinal:1   No nausea, no vomiting, no diarrhea, no abdominal pain, no changes in bowel habits, no melena, no loss of bowel control1   Genitourinary:1   No incontinence, no feelings of urinary urgency, no increase in frequency, no urinary hesitancy, no dysuria, no hematuria1   Musculoskeletal:1  head/neck/back pain1   Integumentary1   No masses, no rash, no skin lesions, no livedo reticularis1   Psychiatric:1   No anxiety, no depression, no mood swings, no psychiatric hospitalizations, no sleep problems1   Endocrine1   No unusual weight loss or gain, no excessive urination, no excessive thirst, no hair loss or gain, no hot or cold intolerance, no menstrual period change or irregularity, no loss of sexual ability or drive, no erection difficulty, no nipple discharge1   Hematologic/Lymphatic:1  a tendency for easy bruising1   Neurological General:1  headache1   Neurological Mental Status:1    No confusion, no mood swings, no alteration or loss of consciousness, no difficulty expressing/understanding speech, no memory problems1   Neurological Cranial Nerves:1   No blurry or double vision, no loss of vision, no face drooping, no facial numbness or weakness, no taste or smell loss/changes, no hearing loss or ringing, no vertigo or dizziness, no dysphagia, no slurred speech1   Neurological Motor findings include:1   No tremor, no twitching, no cramping(pre/post exercise), no atrophy1   Neurological Coordination:1   No unsteadiness, no vertigo or dizziness, no clumsiness, no problems reaching for objects1   Neurological Sensory:1   No numbness, no pain, no tingling, does not fall when eyes closed or taking a shower1   Neurological Gait:1   No difficulty walking, not falling to one side, no sensation of being pushed, has not had falls1   ROS Reviewed:   ROS reviewed  1 Amended By: Lucita Mckee; Dec 04 2017 9:47 AM EST    Active Problems  1  Abnormal mammogram of right breast (793 80) (R92 8)  2  Acute maxillary sinusitis (461 0) (J01 00)  3  Adhesive capsulitis of right shoulder (726 0) (M75 01)  4  Carpal tunnel syndrome on left (354 0) (G56 02)  5  Cervical radiculopathy (723 4) (M54 12)  6  Chronic migraine (346 70) (G43 709)  7  Chronic venous insufficiency (459 81) (I87 2)  8  Diabetes mellitus screening (V77 1) (Z13 1)  9  Encounter for long term current azathioprine therapy (V58 69) (Z79 899)  10  Encounter for mammogram to establish baseline mammogram (V76 12) (Z12 31)  11  Fatigue (780 79) (R53 83)  12  Hemorrhoids, external (455 3) (K64 4)  13  Migraine, unspecified, not intractable, without status migrainosus (346 90) (G43 909)  14  Myofascial pain syndrome (729 1) (M79 1)  15  Need for lipid screening (V77 91) (Z13 220)  16  Rotator cuff syndrome, right (726 10) (M75 101)  17  Swelling of both lower extremities (729 81) (M79 89)  18  Systemic lupus erythematosus (710 0) (M32 9)  19  Visit for screening mammogram (V76 12) (Z12 31)    Past Medical History  1   History of Arthralgia of multiple joints (752 26) (M25 50)  2  History of systemic lupus erythematosus (SLE) (V12 29) (Z87 39)  3  History of Left leg DVT (453 40) (I82 402)  4  History of Lipid screening (V77 91) (Z13 220)  5  Migraine, unspecified, not intractable, without status migrainosus (346 90) (G43 909)  6  History of Partial tear of right rotator cuff (840 4) (M75 111)  Active Problems And Past Medical History Reviewed: The active problems and past medical history were reviewed and updated today  Surgical History  1  History of Hysterectomy  Surgical History Reviewed: The surgical history was reviewed and updated today  Family History  Mother   1  Family history of chronic obstructive pulmonary disease (V17 6) (Z82 5)  2  Family history of essential hypertension (V17 49) (Z82 49)  Sister   3  Family history of rheumatoid arthritis (V17 7) (Z82 61)  Maternal Relatives   4  Family history of systemic lupus erythematosus (V19 4) (Z82 69)  Family History   5  Family history of chronic obstructive pulmonary disease (V17 6) (Z82 5)  6  Denied: Family history of Crohn's disease  7  Denied: Family history of psoriasis  8  Denied: Family history of ulcerative colitis  Family History Reviewed: The family history was reviewed and updated today  Social History     · Employed   · Never a smoker   · No alcohol use   · No drug use   · Non-smoker (V49 89) (Z78 9)   · Denied: History of Social alcohol use  Social History Reviewed: The social history was reviewed and updated today  The social history was reviewed and is unchanged  Current Meds  1  Aspirin 81 MG TABS; TAKE 1 TABLET DAILY; Therapy: (Recorded:[id number]) to Recorded  2  Mobic 7 5 MG/5ML SUSP; TAKE 5 ML DAILY AS NEEDED; Therapy: (Recorded:[id number]) to Recorded  3  Plaquenil 200 MG Oral Tablet; TAKE 1 TABLET TWICE DAILY; Therapy: (Recorded:[id number]) to Recorded  Medication List Reviewed: The medication list was reviewed and updated today  Allergies    1   No Known Drug Allergies    Vitals  Signs   Recorded: 75LTB0304 08:31AM   Heart Rate: 89  Systolic: 024, LUE, Sitting  Diastolic: 74, LUE, Sitting  Height: 5 ft   Weight: 188 lb 3 oz  BMI Calculated: 36 75  BSA Calculated: 1 82  O2 Saturation: 98    Physical Exam  General: Patient is not in any acute distress  Mental status: Alert, awake oriented x 3 and follows commands Heart: RRR Chest: Clear to auscultation bilaterally Abdomen: soft and non-tender  Skin: no lesions Neurologic Examination:  Speech: Speech is fluent, reading, writing, naming, repetition and comprehension intact  Cranial Nerves: Pupils equal round reactive to light and extraocular movements intact  Visual fields full to confrontation  Fundus exam - normal, no papilledema noted  Facial sensation intact to soft touch in V1, V2 and V3 distributions  Face symmetric  Tongue midline, able to move side to side  Shoulder shrug strong  Motor: Strength 5/5 in all 4 extremities  No drift  Normal rapid alternating movements  Normal tone  Sensory: Sensation intact to soft touch in all 4 extremities  Cerebellar: Finger-to-nose intact, normal heel to shin  Reflexes: 2+ in all 4 extremities, downgoing toes bilaterally Gait: Normal gait, tandem walking, normal arm to swing  Message  Return to work or school:   Jj Britton is under my professional care  She was seen in my office on 12/4/17       She will need to be excused for missing work today due to my appointment with her  Odette Escobar      Future Appointments    Date/Time Provider Specialty Site   12/15/2017 01:30 PM Vernon Thomas MD Hematology Oncology [facility] MEDICAL ONCOLOGY       Signatures   Electronically signed by : Ernesto Mckinney MD; Dec  4 2017  9:11AM EST                       (Author)    Electronically signed by : Ernesto Mckinney MD; Dec  4 2017  3:27PM EST                       (Author)

## 2017-12-07 NOTE — PROGRESS NOTES
Assessment    1  Swelling of both lower extremities (909 81) (M79 89)   2  History of Left leg DVT (453 40) (I82 402)   3  Chronic venous insufficiency (459 81) (I87 2)    Discussion/Summary  Discussion Summary:   58-year-old female with history of lupus, fibromyalgia, chronic bilateral lower extremity swelling L>R Referred by Dr Neisha Oliver for vascular evaluation  She has history of left lower extremity DVT as a teenager  She is on aspirin 81 mg daily  discussed the physiology of venous disease have recommended conservative measures for her lower extremity swelling as it is likely multifactorial Rx for 20-30 mmHg compression given  She will get size and fitted for compression  She will wear compression during the day and removed at night  to elevate her leg frequently  I have asked for her to exercise 30 minutes a day with walking to manage lower extremity swelling  will see her back in the office on an as-needed basis any concerns she will notify the office  Counseling Documentation With Imm: The was counseled regarding diagnostic results,-- instructions for management,-- risk factor reductions,-- prognosis,-- patient and family education,-- impressions,-- risks and benefits of treatment options,-- importance of compliance with treatment  Chief Complaint  Chief Complaint Free Text Note Form:  I'm here to check my legsis new to our office, referred by her rheumatology  complaints of swollen left leg/ pain when walking that has been on going for years  Pt has lupus and is unsure if its related to that  LEV done 11/17/17  pt has history of LL blood-clot many years ago  pt denies discoloration, open wounds  Pt does not wear compression stockings  History of Present Illness  HPI: 58-year-old female with history of lupus, fibromyalgia, chronic bilateral lower extremity swelling L>R Referred by Dr Neisha Oliver for vascular evaluation  She has history of left lower extremity DVT as a teenager   She is on aspirin 81 mg daily  She has had bilateral lower extremity swelling for many years left greater than right  She has associated heaviness, throbbing, aching discomfort the legs  She has gained approximately 50 lb in the past year due to prednisone usage  In the past year her leg discomfort and swelling has worsened  She has worn compression hose in the past and requests a new script  She had a venous Doppler 11/17 which was negative for DVT  In fact she has had multiple lower extremity Dopplers in May 2017 and September 2016 which were all negative for DVT and noted triphasic arterial waveforms  She also had CT of the abdomen and pelvis 9/ 24/16 with no etiology for lower extremity swelling  She has no prominent lower extremity varicosities on exam primarily sits at a desk and elevates her leg on a regular basis  She is otherwise sedentary does not exercise routinely  She follows a strict low-sodium diet  Review of Systems  Complete Female - Vasc:  Constitutional: no loss in appetite,-- recent weight gain,-- feeling tired-- and-- 10 lbs, but-- no fever,-- no chills-- and-- no recent weight loss  Eyes: no sudden vision loss,-- no blurred vision-- and-- no double vision, but-- no eye pain,-- no eyesight problems,-- no dryness of the eyes,-- eyes not red,-- no purulent discharge from the eyes,-- no itching of the eyes-- and-- wears glasses  ENT: hearing loss, but-- no earache,-- no nosebleeds,-- no sore throat,-- no nasal discharge-- and-- no hoarseness  Cardiovascular: no bleeding veins, but-- regular heart rate,-- no chest pain,-- no intermittent leg claudication,-- painful veins,-- no palpitations-- and-- leg pain with walking  Respiratory: no wheezing,-- no cough,-- no orthopnea-- and-- no complaints of PND, but-- shortness of breath-- and-- shortness of breath during exertion  Gastrointestinal: No nausea, No vomiting, no diarrhea, no blood in stool  Genitourinary: no dysuria, no Hematuria,no urinary incontinence  Musculoskeletal: no lumbar pain,-- joint swelling,-- joint stiffness-- and-- limb swelling, but-- no limb pain-- and-- no myalgias  Integumentary: itching-- and-- no ulcers, but-- no rashes,-- no dry skin,-- no skin lesions-- and-- no skin wound  Neurological: headache-- and-- no dementia, but-- no numbness,-- no confusion,-- no dizziness,-- no limb weakness,-- no convulsions,-- no fainting-- and-- no difficulty walking  Psychiatric: no depression, no mood disorders, no anxiety  Hematologic/Lymphatic: swollen glands-- and-- a tendency for easy bruising  ROS Reviewed:   ROS reviewed  Active Problems     1  Abnormal mammogram of right breast (793 80) (R92 8)   2  Acute maxillary sinusitis (461 0) (J01 00)   3  Adhesive capsulitis of right shoulder (726 0) (M75 01)   4  Carpal tunnel syndrome on left (354 0) (G56 02)   5  Cervical radiculopathy (723 4) (M54 12)   6  Diabetes mellitus screening (V77 1) (Z13 1)   7  Encounter for long term current azathioprine therapy (V58 69) (Z79 899)   8  Encounter for mammogram to establish baseline mammogram (V76 12) (Z12 31)   9  Fatigue (780 79) (R53 83)   10  Hemorrhoids, external (455 3) (K64 4)   11  Myofascial pain syndrome (729 1) (M79 1)   12  Need for lipid screening (V77 91) (Z13 220)   13  Rotator cuff syndrome, right (726 10) (M75 101)   14  Systemic lupus erythematosus (710 0) (M32 9)   15  Visit for screening mammogram (V76 12) (Z12 31)  Migraine, unspecified, not intractable, without status migrainosus (346 90) (G43 909)     Chronic migraine (346 70) (G43 709)       Past Medical History     1  History of Arthralgia of multiple joints (719 49) (M25 50)   2  History of systemic lupus erythematosus (SLE) (V12 29) (Z87 39)   3  History of Lipid screening (V77 91) (Z13 220)   4  History of Partial tear of right rotator cuff (840 4) (D77 111)  Active Problems And Past Medical History Reviewed:    The active problems and past medical history were reviewed and updated today  Migraine, unspecified, not intractable, without status migrainosus (346 90) (G43 909)          Surgical History    1  History of Hysterectomy  Surgical History Reviewed: The surgical history was reviewed and updated today  Family History   Mother    1  Family history of essential hypertension (V17 49) (Z82 49)  Sister    2  Family history of rheumatoid arthritis (V17 7) (Z82 61)  Maternal Relatives    3  Family history of systemic lupus erythematosus (V19 4) (Z82 69)  Family History    4  Denied: Family history of Crohn's disease   5  Denied: Family history of psoriasis   6  Denied: Family history of ulcerative colitis  Family History Reviewed: The family history was reviewed and updated today  Family history of chronic obstructive pulmonary disease (V17 6) (Z82 5)          Social History     · Employed   · Never a smoker   · No alcohol use   · No drug use   · Non-smoker (V49 89) (Z78 9)   · Denied: History of Social alcohol use  Social History Reviewed: The social history was reviewed and updated today  Current Meds   1  Aspirin 81 MG TABS; Therapy: (Recorded:[id number]) to Recorded   2  Mobic 7 5 MG/5ML SUSP; Therapy: (Recorded:63Nxr6277) to Recorded   3  Plaquenil 200 MG Oral Tablet; Therapy: (QGYZFBEX:[id number]) to Recorded  Medication List Reviewed: The medication list was reviewed and updated today  Allergies  1  No Known Drug Allergies    Vitals  Vital Signs    Recorded: [id number] 09:00AM   Temperature 97 7 F, Tympanic   Heart Rate 74, L Radial   Pulse Quality Normal, L Radial   Respiration Quality Rapid   Respiration 16   Systolic 555, RUE, Sitting   Diastolic 68, RUE, Sitting   Height 5 ft    Weight 193 lb    BMI Calculated 37 69   BSA Calculated 1 84       Physical Exam   Radial: right 2+  Femoral: right 2+-- and-- left 2+  Dorsalis pedis: right 2+-- and-- left 2+  Distal Pulse Exam: Normal Capillary Refill      Extremities: bilateral ankle trace+ pitting edema,-- bilateral pretibial trace+ pitting edema-- and-- no foot edema  LE Varicose Veins: No Varicose Veins are Present  The heart rate was normal  The rhythm was regular  Heart sounds: normal S1-- and-- normal S2   Murmurs: No murmurs were heard  Pulmonary  Respiratory effort: No increased work of breathing or signs of respiratory distress  Auscultation of lungs: Clear to auscultation  No wheezing, no rales, no rhonchi  Abdomen  Abdomen: Abdomen soft, non-tender, no masses, non distended, no rebound tenderness  Psychiatric  Orientation to person, place and time: Normal   Mood and affect: Normal   Eyes  Conjunctiva and lids: No swelling, erythema, or discharge  Ears, Nose, Mouth, and Throat  Hearing: Normal   Neurologic Sensory exam normal  Motor skills intact  Musculoskeletal  Gait and station: Normal   Digits and nails: Normal without clubbing or cyanosis  -- erythematous rash of the right 2,3,4th toes  Skin  Skin and subcutaneous tissue: Normal without rashes or lesions  Palpation of skin and subcutaneous tissue: Normal turgor  Venous Disease: No lipodermatosclerosis, stasis dermatitis, hyperpigmentation, or atrophie jimi noted on exam       Results/Data  VAS LOWER LIMB VENOUS DUPLEX STUDY, UNILATERAL/LIMITED [id number] 02:29PM Deborah Arias     Test Name Result Flag Reference   VAS LOWER LIMB VENOUS DUPLEX STUDY, UNILATERAL/LIMITED (Report)       [facility]PORT  CLINICAL:  Indications: Left Swelling of Limb [R22 4]  The patient has a history of lupus, and her doctor said her blood work showed  she is prone to blood clots  She reports a history of blood clots in the past   She does take an aspirin daily  She states she woke up this morning and her left leg is swollen, she states it  appears to be the whole leg    Operative History:  Hysterectomy    FINDINGS:    Segment Right      Left         Impression    Impression      CFV   Normal (Patent) Normal (Patent)         CONCLUSION:  Impression: RIGHT LOWER LIMB LIMITED: NORMAL  Evaluation shows no evidence of thrombus in the common femoral vein  Doppler evaluation shows a normal response to augmentation maneuvers  LEFT LOWER LIMB: NORMAL  No evidence of acute or chronic deep vein thrombosis  No evidence of superficial thrombophlebitis noted  Doppler evaluation shows a normal response to augmentation maneuvers  Popliteal, posterior tibial arterial Doppler waveforms are triphasic  Tech Note: Preliminary report called to Emiliano Troy at physician office  3:30pm  KT    SIGNATURE:  Electronically Signed by: Alonso Lara MD, RPVI on 2017-11-17 04:40:14 PM     VAS LOWER LIMB VENOUS DUPLEX STUDY, UNILATERAL/LIMITED [id number] 04:01PM Ahmet Sarkar Order Number: [id number]  Performing Comments: LEFT CALF PAIN, SWELLING   - Patient Instructions: To schedule this appointment, please contact Central Scheduling at [phone]  Test Name Result Flag Reference   VAS LOWER LIMB VENOUS DUPLEX STUDY, UNILATERAL/LIMITED (Report)       [facility] REPORT  CLINICAL:  Indications: Left Swelling of Limb [R22 4]  The patient presents with  intermittent left leg swelling  The patient has a history of Lupus  Operative History:  Hysterectomy  Risk Factors: The patient has no history of obesity, DVT, limb trauma or  malignancy  Clinical:Left Lower Limb  There is edema  FINDINGS:    Left   Impression      CFV   Normal (Patent)         CONCLUSION:  Impression:  RIGHT LOWER LIMB LIMITED: NORMAL  Evaluation shows no evidence of thrombus in the common femoral vein  Doppler evaluation shows a normal response to augmentation maneuvers  LEFT LOWER LIMB: NORMAL  No evidence of acute or chronic deep vein thrombosis  No evidence of superficial thrombophlebitis noted  Doppler evaluation shows a normal response to augmentation maneuvers  Popliteal, posterior tibial and anterior tibial arterial Doppler waveforms are  triphasic      SIGNATURE: Electronically Signed by: Cookie Car MD, RPVI on 2017-05-18 06:04:31 PM     * CT ABDOMEN PELVIS WO CONTRAST 14Oct2016 07:47PM Nadine Sarkar     Test Name Result Flag Reference   CT ABDOMEN PELVIS WO CONTRAST (Report)       CT ABDOMEN AND PELVIS WITHOUT IV CONTRAST   INDICATION: Left leg swelling   COMPARISON: 7/20/2015   TECHNIQUE: CT examination of the abdomen and pelvis was performed without intravenous contrast  This examination, like all CT scans performed in the [facility], was performed utilizing techniques to minimize radiation dose exposure,   including the use of iterative reconstruction and automated exposure control  Axial, sagittal, and coronal reformatted images were submitted for interpretation  Intravenous contrast was not administered as part of this examination  Enteric contrast   was administered  FINDINGS:   ABDOMEN   LOWER CHEST: There is a stable small left lower lobe lung nodule on series 2, image 8  There is mild gastroesophageal reflux  LIVER/BILIARY TREE: Unremarkable  GALLBLADDER: No calcified gallstones  No pericholecystic inflammatory change  SPLEEN: Unremarkable  PANCREAS: Unremarkable  ADRENAL GLANDS: Unremarkable  KIDNEYS/URETERS: Unremarkable  No hydronephrosis  STOMACH AND BOWEL: There is mild diverticulosis coli  APPENDIX: No findings to suggest appendicitis  ABDOMINOPELVIC CAVITY: No ascites or free intraperitoneal air  No lymphadenopathy  VESSELS: Unremarkable for patient's age  PELVIS   REPRODUCTIVE ORGANS: Patient is status post hysterectomy  URINARY BLADDER: Unremarkable  ABDOMINAL WALL/INGUINAL REGIONS: Unremarkable  OSSEOUS STRUCTURES: No acute fracture or destructive osseous lesion  IMPRESSION:   1  No etiology for leg swelling identified  2  Mild diverticulosis coli  3  4 mm left lower lobe lung nodule, stable for greater than one year      Workstation performed: [id number]   Signed by:  Angela Hdez MD  10/17/16 Message  Return to work or school:   Vincent Resendiz is under my professional care  She was seen in my office on 12/1/17       Briana WATTS        Future Appointments    Date/Time Provider Specialty Site   02/16/2018 09:00 AM Manuel Rodrigues MD Neurology [facility]   12/15/2017 01:30 PM Raquel Carreon MD Hematology [facility]       Signatures   Electronically signed by : Joann Saavedra; Dec  1 2017  9:44AM EST                       (Author)    Electronically signed by : Андрей Ambrocio DO; Dec  6 2017  9:13AM EST                       (Author)    Electronically signed by : Андрей Ambrocio DO; Dec  6 2017  9:13AM EST                       (Author)

## 2017-12-15 ENCOUNTER — ALLSCRIPTS OFFICE VISIT (OUTPATIENT)
Dept: OTHER | Facility: OTHER | Age: 52
End: 2017-12-15

## 2017-12-15 DIAGNOSIS — R76.8 OTHER SPECIFIED ABNORMAL IMMUNOLOGICAL FINDINGS IN SERUM: ICD-10-CM

## 2017-12-15 DIAGNOSIS — D72.819 DECREASED WHITE BLOOD CELL COUNT: ICD-10-CM

## 2017-12-18 NOTE — CONSULTS
Assessment  1  Anti-cardiolipin antibody positive (795 79) (R76 8)   2  Lung nodule (793 11) (R91 1)   3  Leukopenia (288 50) (D72 819)    Plan  Anti-cardiolipin antibody positive    · (1) ANTICARDIOLIPIN ANTIBODY; Status:Active; Requested for:37Ntv2654;    Perform:[facility]; Due:88Xsy3726; Ordered; For:Anti-cardiolipin antibody positive; Ordered By:Proothi, Eric;  Leukopenia    · We recommend that you examine your own breasts for lumps and other changes  ;Status:Complete;   Done: 91JRT7977   Ordered; For:Leukopenia; Ordered By:Proothi, Eric;   · (1) CBC/PLT/DIFF; Status:Active; Requested for:68Iwl7249;    Perform:[facility]; Due:80Xok2236; Ordered; For:Leukopenia; Ordered By:Proothi, Eric;   · Follow-up visit in 1 month Evaluation and Treatment  Follow-up  Status: Complete  Done:25Jan2018 09:40AM   Ordered; For: Leukopenia; Ordered By: Ann-Marie Watt Performed:  Due: 24VVR6694; Last Updated By: Michael Justice; 12/15/2017 2:55:23 PM  Lung nodule    · * CT CHEST WO CONTRAST; Status:Need Information - Financial Authorization; Requested for:56Mgh2477;    Perform:[facility]; Due:12Uyk0869; Last Updated Beth Mantillams; 12/15/2017 2:57:49 PM;Ordered; For:Lung nodule; Ordered By:Proothi, Eric;    Discussion/Summary    Patient is 45 year of age  When she was 15 she was hospitalized for 1 month with blood clot in her left leg below the knee  She states that she was not sent home on blood thinner  No blood clot since  She had 3 pregnancies and 2 live deliveries and there was no blood clot  She was not on blood thinner during pregnancies  She did not take birth control pills  She had hysterectomy for endometriosis 13 years ago and again there was no blood clot  No family history of blood clot  6 years ago she was diagnosed to have lupus and she has been on Plaquenil 200 mg twice a day and 81 mg aspirin daily  On routine blood work she was found to have anticardiolipin IgM antibody, 36  Negative lupus anticoagulant  Negative beta 2 glycoprotein antibody  MARCELINO titer 160  No family history of blood clot  6 years ago she had bronchoscopy and biopsy of lung nodule and that was benign and there was no follow-up  Off and on she has swelling of left upper neck lymph node  Has some tiredness  Off and on shortness of breath  Off and on swelling of ankles when she is on her feet too long  History of abnormal mammogram and she was getting mammography every 6 months but now she will be going for screening mammography in 1 year  Migraine headaches  Physical examination and test results are as recorded and discussed  She has positive anticardiolipin IgM antibody, positive MARCELINO, history of phlebitis at age 11 but no blood thinner post discharge from the hospital and no blood clot later on not even during pregnancy and surgery and no family history of blood clot  Negative venous Doppler study  She would be at increased risk of blood clot because of positive anticardiolipin IgM antibody  She is on 81 mg aspirin daily and she will continue to take that  Blood will be checked for anticardiolipin antibody again  History of lung nodule 6 years ago and that will be rechecked by CT scan  Leukopenia/neutropenia and that could be secondary to lupus or Plaquenil  Explained all this to the patient in detail especially how to prevent blood clot  Questions answered  She has follow-up office appointment  The patient was counseled regarding diagnostic results,-- patient and family education,-- impressions  total time of encounter was 75 minutes-- and-- 45 minutes was spent counseling  The patient has the current Goals: Prevention of blood clot  Follow-up on lung nodule  Prevention of febrile neutropenia  The patent has the current Barriers: No barriers  Patient is able to Self-Care  The treatment plan was reviewed with the patient/guardian   The patient/guardian understands and agrees with the treatment plan     Self Referrals: No      Chief Complaint  Consultation for positive anti cardiolipin IgM antibody  History of Present Illness  Patient is 45 year of age  When she was 15 she was hospitalized for 1 month with blood clot in her left leg below the knee  She states that she was not sent home on blood thinner  No blood clot since  She had 3 pregnancies and 2 live deliveries and there was no blood clot  She was not on blood thinner during pregnancies  She did not take birth control pills  She had hysterectomy for endometriosis 13 years ago and again there was no blood clot  No family history of blood clot  6 years ago she was diagnosed to have lupus and she has been on Plaquenil 200 mg twice a day and 81 mg aspirin daily  On routine blood work she was found to have anticardiolipin IgM antibody, 36  Negative lupus anticoagulant  Negative beta 2 glycoprotein antibody  MARCELINO titer 160  No family history of blood clot  6 years ago she had bronchoscopy and biopsy of lung nodule and that was benign and there was no follow-up  Off and on she has swelling of left upper neck lymph node  Has some tiredness  Off and on shortness of breath  Off and on swelling of ankles when she is on her feet too long  History of abnormal mammogram and she was getting mammography every 6 months but now she will be going for screening mammography in 1 year  Migraine headaches  Review of Systems   No  seizures, diplopia, dysphagia, hoarseness, angina pain, chest pain, palpitations,  cough, hemoptysis, abdominal pain, melena, or hematuria  No fever, chills, bleeding, bone pains, skin rash, weight loss, nausea, vomiting and no change in bowel habits  Appetite is fair  No night sweats  Patient has arthritic symptoms  No leg cramps  Not unusually sensitive to heat or cold  No recent or frequent infections  Anxious  No GYN symptoms  Reviewed 14 systems  Other symptoms as in HPI  ROS reviewed  Active Problems  1   Abnormal mammogram of right breast (480 82) (R92 8)   2  Acute maxillary sinusitis (461 0) (J01 00)   3  Adhesive capsulitis of right shoulder (726 0) (M75 01)   4  Carpal tunnel syndrome on left (354 0) (G56 02)   5  Cervical radiculopathy (723 4) (M54 12)   6  Chronic migraine (346 70) (G43 709)   7  Chronic venous insufficiency (459 81) (I87 2)   8  Diabetes mellitus screening (V77 1) (Z13 1)   9  Encounter for long term current azathioprine therapy (V58 69) (Z79 899)   10  Encounter for mammogram to establish baseline mammogram (V76 12) (Z12 31)   11  Fatigue (780 79) (R53 83)   12  Hemorrhoids, external (455 3) (K64 4)   13  Migraine, unspecified, not intractable, without status migrainosus (346 90) (G43 909)   14  Myofascial pain syndrome (729 1) (M79 1)   15  Need for lipid screening (V77 91) (Z13 220)   16  Rotator cuff syndrome, right (726 10) (M75 101)   17  Swelling of both lower extremities (729 81) (M79 89)   18  Systemic lupus erythematosus (710 0) (M32 9)   19  Visit for screening mammogram (V76 12) (Z12 31)    Past Medical History  1  History of Arthralgia of multiple joints (719 49) (M25 50)   2  History of systemic lupus erythematosus (SLE) (V12 29) (Z87 39)   3  History of Left leg DVT (453 40) (I82 402)   4  History of Lipid screening (V77 91) (Z13 220)   5  History of Lupus (695 4) (L93 0)   6  Migraine, unspecified, not intractable, without status migrainosus (346 90) (G43 909)   7  History of Partial tear of right rotator cuff (840 4) (M75 111)    The active problems and past medical history were reviewed and updated today  Surgical History  1  History of Hysterectomy    The surgical history was reviewed and updated today  Family History  Mother    1  Family history of chronic obstructive pulmonary disease (V17 6) (Z82 5)   2  Family history of essential hypertension (V17 49) (Z82 49)   3  Family history of myocardial infarction (V17 3) (Z82 49)  Father    4  Family history of diabetes mellitus (V18 0) (Z83 3)   5   Family history of hypertension (V17 49) (Z82 49)   6  Family history of malignant neoplasm of prostate (V16 42) (Z80 42)   7  Family history of High cholesterol  Sister    8  Family history of rheumatoid arthritis (V17 7) (Z82 61)  Brother    9  Family history of diabetes mellitus (V18 0) (Z83 3)  Maternal Grandmother    10  Family history of   Paternal Grandmother    6  Family history of   Maternal Grandfather    15  Family history of   Paternal Grandfather    15  Family history of   Maternal Relatives    15  Family history of systemic lupus erythematosus (V19 4) (Z82 69)  Family History    15  Family history of chronic obstructive pulmonary disease (V17 6) (Z82 5)   16  Denied: Family history of Crohn's disease   16  Denied: Family history of psoriasis   18  Denied: Family history of ulcerative colitis    The family history was reviewed and updated today  Social History   · Caffeine use (V49 89) (F15 90)   · Completed 12th grade   ·    · Employed   · Never a smoker   · No alcohol use   · No drug use   · No illicit drug use   · Non-smoker (V49 89) (Z78 9)   · Occupation   · Denied: History of Social alcohol use  The social history was reviewed and updated today  Current Meds   1  Aspirin 81 MG TABS; TAKE 1 TABLET DAILY; Therapy: (Recorded:39Xqt5795) to Recorded   2  Butalbital-APAP-Caffeine -40 MG Oral Capsule; TAKE 1 CAPSULE Every 6 hours PRN; Therapy: 01XYY7905 to (Last Rx:75Iuq2010) Ordered   3  Mobic 7 5 MG/5ML SUSP; TAKE 5 ML DAILY AS NEEDED; Therapy: (Recorded:63Qzx6514) to Recorded   4  Plaquenil 200 MG Oral Tablet; TAKE 1 TABLET TWICE DAILY; Therapy: (Recorded:51Qzn8211) to Recorded   5  Propranolol HCl - 40 MG Oral Tablet; Take 1 tablet twice daily; Therapy: 10UOF4425 to (Evaluate:29Dqr1992); Last Rx:98Zmk7961 Ordered   6  Zomig 5 MG Nasal Solution; USE AS DIRECTED;  Therapy: 29LLB7114 to (Last Rx:39Pmu2822) Ordered    The medication list was reviewed and updated today  Allergies  1  No Known Drug Allergies  Reviewed   Vitals  Vital Signs    Recorded: 58Tro5538 01:53PM   Temperature 97 5 F   Heart Rate 67   Respiration 15   Systolic 140   Diastolic 68   Height 5 ft 1 in   Weight 192 lb 2 oz   BMI Calculated 36 3   BSA Calculated 1 86   O2 Saturation 98   Pain Scale 0     Reviewed   Physical Exam      Patient is alert and oriented and not in any acute distress  Stable vitals  Is No icterus  No oral thrush  No palpable neck mass  Lung fields are clear to percussion and auscultation  Heart rate is regular  Abdomen soft and nontender  No palpable abdominal mass  No ascites  No edema of ankles  No calf tenderness  No focal neurological deficit  No skin rash  No palpable lymphadenopathy  Good arterial pulses  No clubbing  Anxious  Performance status one  Her primary physician examined her breasts  ECOG 1       Results/Data  VAS LOWER LIMB VENOUS DUPLEX STUDY, UNILATERAL/LIMITED 83DHO1686 02:29PM Prashant Arias     Test Name Result Flag Reference   VAS LOWER LIMB VENOUS DUPLEX STUDY, UNILATERAL/LIMITED (Report)     [facility] REPORT  CLINICAL:  Indications: Left Swelling of Limb [R22 4]  The patient has a history of lupus, and her doctor said her blood work showed  she is prone to blood clots  She reports a history of blood clots in the past   She does take an aspirin daily  She states she woke up this morning and her left leg is swollen, she states it  appears to be the whole leg  Operative History:  Hysterectomy    FINDINGS:    Segment Right      Left         Impression    Impression      CFV   Normal (Patent) Normal (Patent)         CONCLUSION:  Impression:  RIGHT LOWER LIMB LIMITED: NORMAL  Evaluation shows no evidence of thrombus in the common femoral vein  Doppler evaluation shows a normal response to augmentation maneuvers      LEFT LOWER LIMB: NORMAL  No evidence of acute or chronic deep vein thrombosis  No evidence of superficial thrombophlebitis noted  Doppler evaluation shows a normal response to augmentation maneuvers  Popliteal, posterior tibial arterial Doppler waveforms are triphasic  Tech Note: Preliminary report called to Ginette Argueta at physician office  3:30pm  KT    SIGNATURE:  Electronically Signed by: Hilario Riley MD, RPVI on 2017-11-17 04:40:14 PM     (1) CBC/PLT/DIFF 23Oct2017 10:58AM Sylvia Arias     Test Name Result Flag Reference   WBC COUNT 3 24 Thousand/uL L 4 31-10 16   RBC COUNT 5 18 Million/uL H 3 81-5 12   HEMOGLOBIN 14 9 g/dL  11 5-15 4   HEMATOCRIT 45 1 %  34 8-46  1   MCV 87 fL  82-98   MCH 28 8 pg  26 8-34 3   MCHC 33 0 g/dL  31 4-37 4   RDW 14 6 %  11 6-15 1   MPV 10 5 fL  8 9-12 7   PLATELET COUNT 562 Thousands/uL  149-390   nRBC AUTOMATED 0 /100 WBCs     NEUTROPHILS RELATIVE PERCENT 54 %  43-75   LYMPHOCYTES RELATIVE PERCENT 38 %  14-44   MONOCYTES RELATIVE PERCENT 7 %  4-12   EOSINOPHILS RELATIVE PERCENT 0 %  0-6   BASOPHILS RELATIVE PERCENT 1 %  0-1   NEUTROPHILS ABSOLUTE COUNT 1 72 Thousands/? ??L L 1 85-7 62   LYMPHOCYTES ABSOLUTE COUNT 1 24 Thousands/? ??L  0 60-4 47   MONOCYTES ABSOLUTE COUNT 0 24 Thousand/? ??L  0 17-1 22   EOSINOPHILS ABSOLUTE COUNT 0 01 Thousand/? ??L  0 00-0 61   BASOPHILS ABSOLUTE COUNT 0 02 Thousands/? ??L  0 00-0 10   This is a patient instruction: This test is non-fasting  Please drink two glasses of water morning of bloodwork  (1) T4, FREE 23Oct2017 10:58AM Bijan Arias     Test Name Result Flag Reference   T4,FREE 1 02 ng/dL  0 76-1 46   Specimen collection should occur prior to Sulfasalazine administration due to the potential for falsely elevated results       (1) C-REACTIVE PROTEIN 23Oct2017 10:58AM Bijan Arias     Test Name Result Flag Reference   C-REACT PROTEIN 5 1 mg/L H <3 0     (1) COMPREHENSIVE METABOLIC PANEL 55DMH1190 04:56QW Sylvia Arias     Test Name Result Flag Reference   GLUCOSE,RANDM 83 mg/dL     If the patient is fasting, the ADA then defines impaired fasting glucose as > 100 mg/dL and diabetes as > or equal to 123 mg/dL  Specimen collection should occur prior to Sulfasalazine administration due to the potential for falsely depressed results  Specimen collection should occur prior to Sulfapyridine administration due to the potential for falsely elevated results  SODIUM 138 mmol/L  136-145   POTASSIUM 3 4 mmol/L L 3 5-5 3   CHLORIDE 105 mmol/L  100-108   CARBON DIOXIDE 27 mmol/L  21-32   ANION GAP (CALC) 6 mmol/L  4-13   BLOOD UREA NITROGEN 15 mg/dL  5-25   CREATININE 0 81 mg/dL  0 60-1 30   Standardized to IDMS reference method   CALCIUM 9 2 mg/dL  8 3-10 1   BILI, TOTAL 0 36 mg/dL  0 20-1 00   ALK PHOSPHATAS 109 U/L     ALT (SGPT) 78 U/L  12-78   Specimen collection should occur prior to Sulfasalazine and/or Sulfapyridine administration due to the potential for falsely depressed results  AST(SGOT) 49 U/L H 5-45   Specimen collection should occur prior to Sulfasalazine administration due to the potential for falsely depressed results  ALBUMIN 3 9 g/dL  3 5-5 0   TOTAL PROTEIN 8 4 g/dL H 6 4-8 2   eGFR 84 ml/min/1 73sq m     National Kidney Disease Education Program recommendations are as follows: GFR calculation is accurate only with a steady state creatinine Chronic Kidney disease less than 60 ml/min/1 73 sq  meters Kidney failure less than 15 ml/min/1 73 sq  meters  (1) TSH 19Twg0714 10:58AM Bijan Arias     Test Name Result Flag Reference   TSH 4 450 uIU/mL H 0 358-3 740   This is a patient instruction: This test is non-fasting  Please drink two glasses of water morning of bloodwork  Patients undergoing fluorescein dye angiography may retain small amounts of fluorescein in the body for 48-72 hours post procedure  Samples containing fluorescein can produce falsely depressed TSH values  If the patient had this procedure,a specimen should be resubmitted post fluorescein clearance       The recommended reference ranges for TSH during pregnancy are as follows: First trimester 0 1 to 2 5 uIU/mL Second trimester  0 2 to 3 0 uIU/mL Third trimester 0 3 to 3 0 uIU/m     (1) SED RATE 23Oct2017 10:58AM Bijan Arias     Test Name Result Flag Reference   SED RATE 11 mm/hour  0-20     (1) RHEUMATOID FACTOR SCREEN 23Oct2017 10:58AM [facility], [address] [facility]     Test Name Result Flag Reference   RHEUMATOID FACTOR Negative  Negative     (1) ACUTE HEPATITIS PANEL 23Oct2017 10:58AM Saman Blancas     Test Name Result Flag Reference   HEPATITIS B SURFACE ANTIGEN Non-reactive  Non-reactive, NonReactive - Confirmed   HEPATITIS A IGM ANTIBODY Non-reactive  Non-reactive, Equivocal-Suggest Recollect   HEPATITIS C ANTIBODY Non-reactive  Non-reactive   HEPATITIS B CORE IGM ANTIBODY Non-reactive  Non-reactive     (1) DNA (DS) ANTIBODY 23Oct2017 10:58AM Bijan Arias     Test Name Result Flag Reference   ANTI DNA DOUBLE STRANDED 16 IU/mL H 0 - 9   Negative      <5                                    Equivocal  5 - 9                                    Positive      >9 Performed at:  [facility] [employer] [facility] [facility]  [id number] : Kranthi Sanchez MD, Phone:  [phone]     (1) [address] [place] [address] 10:58AM Saman Blancas     Test Name Result Flag Reference   RNP AB CIE ID <0 2 AI  0 0 - 0 9   Performed at:  [facility] [employer] [facility] [address] [facility]  [id number] : Kranthi Sanchez MD, Phone:  [phone]   [place] AB CIE ID <0 2 AI  0 0 - 0 9     (1) [address] [address] [id number] 10:58AM [facility] [address] [facility]     Test Name Result Flag Reference   SS-A <0 2 AI  0 0 - 0 9   SS-B <0 2 AI  0 0 - 0 9   Performed at:  [employer] [facility] [address] [facility]  [id number] : Kranthi Sanchez MD, Phone:  [phone]     (1) ANTICARDIOLIPIN ANTIBODY 23Oct2017 10:58AM Bijan Arias     Test Name Result Flag Reference   CARDLIP IGA AB <9 APL U/mL  0 - 11   Negative:              <12 Indeterminate:     12 - 20                           Low-Med Positive: >20 - 80                           High Positive:         >80   CARDLIP IGG AB <9 GPL U/mL  0 - 14   Negative:              <15                           Indeterminate:     15 - 20                           Low-Med Positive: >20 - 80                           High Positive:         >80   CARDLIP IGM AB 36 MPL U/mL H 0 - 12   Negative:              <13                           Indeterminate:     13 - 20                           Low-Med Positive: >20 - 80                           High Positive:         >80 Performed at:  [address]  [id number] : Sandeep Malcolm MD, Phone:  [phone]     (1) CYCLIC CITRULLINATED PEPTIDE 34Szd0636 10:58AM [address]     Test Name Result Flag Reference   CYCLIC CITRULLINATED PEPTIDE 4 units  0 - 19   Negative               <20                           Weak positive      20 - 39                           Moderate positive  40 - 59                           Strong positive        >59 Performed at:  [address]  [id number] : Stacy Marshall MD, Phone:  [phone]     (1) [address] [employer] [facility], [address] [place] [address] 10:58AM Bijan Arias     Test Name Result Flag Reference   BETA-2 GLYCOPROTEIN I AB,IGG <9 GPI IgG units  0 - 20   The reference interval reflects a 3SD or 99th percentile interval, which is thought to represent a potentially clinically significant result in accordance with the International Consensus Statement on the classification criteria for definitive antiphospholipid syndrome (APS)  J Thromb Haem 2006;4:295-306     BETA-2 GLYCOPROTEIN I AB,IGM <9 GPI IgM units  0 - 32   The reference interval reflects a 3SD or 99th percentile interval, which is thought to represent a potentially clinically significant result in accordance with the International Consensus Statement on the classification criteria for definitive antiphospholipid syndrome (APS)  J Thromb Haem 2006;4:295-306  Performed at:  [address]  [id number] : Teresita Thomas MD, Phone:  [phone]   Gómez [address] 88 <9 GPI IgA units  0 - 25   The reference interval reflects a 3SD or 99th percentile interval, which is thought to represent a potentially clinically significant result in accordance with the International Consensus Statement on the classification criteria for definitive antiphospholipid syndrome (APS)  J Thromb Haem 2006;4:295-306  (1) MARCELINO SCREEN W/REFLEX TO TITER/PATTERN 17KHT9267 10:58AM [employer]     Test Name Result Flag Reference   MARCELINO SCREEN  Positive A Negative   MARCELINO TITER 1 Titer of 160     MARCELINO PATTERN 1 Speckled pattern       (1) PROTEIN ELECTRO, SERUM 23Oct2017 10:58AM Bijan Arias     Test Name Result Flag Reference   A/G RATIO 1 43  1 10-1 80   Albumin 58 9 %  52 0-65 0   Albumin Conc  4 59 g/dl  3 50-5 00   Alpha 1 Conc  0 36 g/dL  0 10-0 40   ALPHA 1 4 6 %  2 5-5 0   Alpha 2 Conc  0 79 g/dL  0 40-1 20   ALPHA 2 10 1 %  7 0-13 0   Beta 1 Conc  0 44 g/dL  0 40-0 80   BETA-1 5 7 %  5 0-13 0   Beta 2 Conc 0 36 g/dL  0 20-0 50   BETA-2 4 6 %  2 0-8 0   Gamma Conc 1 26 g/dL  0 50-1 60   GAMMA GLOBULIN 16 1 %  12 0-22 0   Interpretation      The serum total protein, albumin and electrophoresis are within normal limits  No monoclonal bands noted  Reviewed by: Yeyo Vázquez MD ([id number]) **Electronic Signature**   TOTAL PROTEIN   7 8 g/dL  6 4-8 2     (1) LUPUS ANTICOAGULANT PROFILE 23Oct2017 10:58AM [employer]     Test Name Result Flag Reference   PTT LUPUS ANTICOAGULANT 55 3 sec H 0 0 - 51 9   DILUTE DAVID VIPER VENOM TIME 54 6 sec H 0 0 - 47 0   DILUTE PROTHROMBIN TIME(DPT) 55 5 sec H 0 0 - 55 0   THROMBIN TIME (DRVW) 21 7 sec  0 0 - 23 0   DPT CONFIRM RATIO 0 80 Ratio  0 00 - 1 40   LUPUS REFLEX INTERPRETATION Comment:     No lupus anticoagulant was detected  Mixing studies suggest the presence of an inhibitor and this could represent a specific factor inhibitor (e g  to factor VIII, IX, or XI), or a direct Xa inhibitor anticoagulant such as rivaroxaban, apixaban or edoxaban  The dPT was extended but the dPT confirmatory ratio was normal  This is consistent with a deficiency or specific inhibition of one or more extrinsic pathway factors (VII, X, V, II or fibrinogen)   Performed at:  [address]  [id number] : Jaxson Wheeler MD, Phone:  [phone]   DRVVT MIX INTERPRETATION 42 7 sec  0 0 - 47 0   Performed at:  [address]  [id number] : Jaxson Wheeler MD, Phone:  [phone]   PTT-LA MIX 49 6 sec H 0 0 - 48 9   Performed at:  [address]  [id number] : Jaxson Wheeler MD, Phone:  [phone]   HEXAGONAL PHOSPHOLIPID NEUTRALIZAT TEST 5 sec  0 - 11   Performed at:  [address], [address] [facility] : Jaxson Wheeler MD, Phone:  [phone]     Future Appointments    Date/Time Provider Specialty Site   02/16/2018 09:00 AM Evert García MD Neurology [address]   01/25/2018 09:40 AM Proothi, Darcel Cogan, MD Hematology [facility] [address] [place] [address]     Signatures   Electronically signed by : Jennifer Saul MD; Dec 17 2017  8:20AM EST                       (Author)

## 2017-12-23 ENCOUNTER — APPOINTMENT (OUTPATIENT)
Dept: LAB | Age: 52
End: 2017-12-23
Payer: COMMERCIAL

## 2017-12-23 ENCOUNTER — TRANSCRIBE ORDERS (OUTPATIENT)
Dept: ADMINISTRATIVE | Age: 52
End: 2017-12-23

## 2017-12-23 DIAGNOSIS — D72.819 DECREASED WHITE BLOOD CELL COUNT: ICD-10-CM

## 2017-12-23 DIAGNOSIS — R76.8 OTHER SPECIFIED ABNORMAL IMMUNOLOGICAL FINDINGS IN SERUM: ICD-10-CM

## 2017-12-23 LAB
BASOPHILS # BLD AUTO: 0.01 THOUSANDS/ΜL (ref 0–0.1)
BASOPHILS NFR BLD AUTO: 0 % (ref 0–1)
EOSINOPHIL # BLD AUTO: 0.04 THOUSAND/ΜL (ref 0–0.61)
EOSINOPHIL NFR BLD AUTO: 1 % (ref 0–6)
ERYTHROCYTE [DISTWIDTH] IN BLOOD BY AUTOMATED COUNT: 14.4 % (ref 11.6–15.1)
HCT VFR BLD AUTO: 44.2 % (ref 34.8–46.1)
HGB BLD-MCNC: 14.1 G/DL (ref 11.5–15.4)
LYMPHOCYTES # BLD AUTO: 1.43 THOUSANDS/ΜL (ref 0.6–4.47)
LYMPHOCYTES NFR BLD AUTO: 41 % (ref 14–44)
MCH RBC QN AUTO: 28.2 PG (ref 26.8–34.3)
MCHC RBC AUTO-ENTMCNC: 31.9 G/DL (ref 31.4–37.4)
MCV RBC AUTO: 88 FL (ref 82–98)
MONOCYTES # BLD AUTO: 0.35 THOUSAND/ΜL (ref 0.17–1.22)
MONOCYTES NFR BLD AUTO: 10 % (ref 4–12)
NEUTROPHILS # BLD AUTO: 1.66 THOUSANDS/ΜL (ref 1.85–7.62)
NEUTS SEG NFR BLD AUTO: 48 % (ref 43–75)
NRBC BLD AUTO-RTO: 0 /100 WBCS
PLATELET # BLD AUTO: 198 THOUSANDS/UL (ref 149–390)
PMV BLD AUTO: 10.9 FL (ref 8.9–12.7)
RBC # BLD AUTO: 5 MILLION/UL (ref 3.81–5.12)
WBC # BLD AUTO: 3.5 THOUSAND/UL (ref 4.31–10.16)

## 2017-12-23 PROCEDURE — 36415 COLL VENOUS BLD VENIPUNCTURE: CPT

## 2017-12-23 PROCEDURE — 86147 CARDIOLIPIN ANTIBODY EA IG: CPT

## 2017-12-23 PROCEDURE — 85025 COMPLETE CBC W/AUTO DIFF WBC: CPT

## 2017-12-26 LAB
CARDIOLIPIN IGA SER IA-ACNC: <9 APL U/ML (ref 0–11)
CARDIOLIPIN IGG SER IA-ACNC: <9 GPL U/ML (ref 0–14)
CARDIOLIPIN IGM SER IA-ACNC: 37 MPL U/ML (ref 0–12)

## 2017-12-27 ENCOUNTER — TRANSCRIBE ORDERS (OUTPATIENT)
Dept: ADMINISTRATIVE | Facility: HOSPITAL | Age: 52
End: 2017-12-27

## 2017-12-27 DIAGNOSIS — R91.1 COIN LESION: Primary | ICD-10-CM

## 2018-01-04 ENCOUNTER — GENERIC CONVERSION - ENCOUNTER (OUTPATIENT)
Dept: OTHER | Facility: OTHER | Age: 53
End: 2018-01-04

## 2018-01-04 ENCOUNTER — HOSPITAL ENCOUNTER (OUTPATIENT)
Dept: RADIOLOGY | Facility: HOSPITAL | Age: 53
Discharge: HOME | End: 2018-01-04
Attending: INTERNAL MEDICINE
Payer: COMMERCIAL

## 2018-01-04 DIAGNOSIS — R91.1 COIN LESION: ICD-10-CM

## 2018-01-04 PROCEDURE — 71250 CT THORAX DX C-: CPT

## 2018-01-09 NOTE — RESULT NOTES
Discussion/Summary   Normal Mammogram   repeat in 1 year   - Dr Powell Fret CAD [id number] 03:04PM Marina Sarkar Order Number: [id number]    - Patient Instructions: To schedule this appointment, please contact Central Scheduling at [phone]  Do not wear any perfume, powder, lotion or deodorant on breast or underarm area  Please bring your doctors order, referral (if needed) and insurance information with you on the day of the test  Failure to bring this information may result in this test being rescheduled  Arrive 15 minutes prior to your appointment time to register  On the day of your test, please bring any prior mammogram or breast studies with you that were not performed at a [facility]  Failure to bring prior exams may result in your test needing to be rescheduled  Test Name Result Flag Reference   MAMMO SCREENING BILATERAL W CAD (Report)     Patient History:   Family history of breast cancer at age 48 or over in paternal    aunt  Benign stereotactic core biopsy of the left breast, 2014  Patient has never smoked  Patient's BMI is 27 1  Reason for exam: screening, asymptomatic  Mammo Screening Bilateral W CAD: May 1, 2017 - Check In #:    [de-identified]   Bilateral MLO and CC view(s) were taken  Technologist: JESU Nguyen (JESU)(M)   Prior study comparison: May 5, 2016, mammo diagnostic right W CAD   performed at [address] [place] [address]  April 28, 2016,   mammo screening bilateral W DBT and CAD, performed at [place] [address]  2014, mammo screening    bilateral  2014, mammo diagnostic bilateral W DBT  2013, mammo    screening bilateral      There are scattered fibroglandular densities  No dominant soft tissue mass, architectural distortion or    suspicious calcifications are noted in either breast  The skin    and nipple contours are within normal limits        No evidence of malignancy  No significant changes when compared with prior studies  ACR BI-RADSï¾® Assessments: BiRad:1 - Negative     Recommendation:   Routine screening mammogram of both breasts in 1 year  A    reminder letter will be scheduled  Analyzed by CAD     8-10% of cancers will be missed on mammography  Management of a    palpable abnormality must be based on clinical grounds  Patients   will be notified of their results via letter from our facility  Accredited by [employer] and FDA       Transcription Location: [facility] 98: [id number]     Risk Value(s):   Tyrer-Cuzick 10 Year: 2 900%, Tyrer-Cuzick Lifetime: 11 100%,    Myriad Table: 1 5%, DELLA 5 Year: 1 1%, NCI Lifetime: 8 7%   Signed by:   Mitzi Valle MD   5/1/17       Signatures   Electronically signed by : Karel Jiménez MD; May  1 2017  4:18PM EST                       (Author)

## 2018-01-10 NOTE — RESULT NOTES
Discussion/Summary   Normal Mammogram   repeat in 1 year   - Dr Kodak Ochoa CAD [id number] 03:04PM Tanner Sarkar Saadia Order Number: [id number]    - Patient Instructions: To schedule this appointment, please contact Central Scheduling at [phone]  Do not wear any perfume, powder, lotion or deodorant on breast or underarm area  Please bring your doctors order, referral (if needed) and insurance information with you on the day of the test  Failure to bring this information may result in this test being rescheduled  Arrive 15 minutes prior to your appointment time to register  On the day of your test, please bring any prior mammogram or breast studies with you that were not performed at a [facility]  Failure to bring prior exams may result in your test needing to be rescheduled  Test Name Result Flag Reference   MAMMO SCREENING BILATERAL W CAD (Report)     ADDENDUM:   The probable septated cyst previously identified (October 2016)    at the 1:00-2:00 position of the right breast, 4 cm from the    nipple, was benign in appearance and slightly decreased in size    compared to the prior study in May 2016  A one year ultrasound    follow-up at the time of yearly screening exam is recommended  However, this would technically be a BI-RADS 3  Patient History:   Family history of breast cancer at age 48 or over in paternal    aunt  Benign stereotactic core biopsy of the left breast, 2014  Patient has never smoked  Patient's BMI is 27 1  Reason for exam: screening, asymptomatic  Mammo Screening Bilateral W CAD: May 1, 2017 - Check In #:    [de-identified]   Bilateral MLO and CC view(s) were taken  Technologist: JESU Ku (JESU)(M)   Prior study comparison: May 5, 2016, mammo diagnostic right W CAD   performed at [facility]   April 28, 2016,   mammo screening bilateral W DBT and CAD, performed at [place] [address]  [facility] [address] [place] [address]  2014, mammo screening    bilateral  2014, mammo diagnostic bilateral W DBT  2013, mammo    screening bilateral      There are scattered fibroglandular densities  No dominant soft tissue mass, architectural distortion or    suspicious calcifications are noted in either breast  The skin    and nipple contours are within normal limits  IMPRESSION: No evidence of malignancy  No significant changes when compared with prior studies  ACR BI-RADSï¾® Assessments: BiRad:3 - Probably Benign     Recommendation:   Routine screening mammogram of both breasts in 1 year  A    reminder letter will be scheduled  Ultrasound of the right breast in 1 year  Analyzed by CAD     8-10% of cancers will be missed on mammography  Management of a    palpable abnormality must be based on clinical grounds  Patients   will be notified of their results via letter from our facility  Accredited by [employer] and FDA  Transcription Location: [address] [place] [address]: [id number]     Risk Value(s):   Tyrer-Cuzick 10 Year: 2 900%, Tyrer-Cuzick Lifetime: 11 100%,    Myriad Table: 1 5%, DELLA 5 Year: 1 1%, NCI Lifetime: 8 7%   Patient History:   Family history of breast cancer at age 48 or over in paternal    aunt  Benign stereotactic core biopsy of the left breast, 2014  Patient has never smoked  Patient's BMI is 27 1  Reason for exam: screening, asymptomatic  Mammo Screening Bilateral W CAD: May 1, 2017 - Check In #:    [de-identified]   Bilateral MLO and CC view(s) were taken  Technologist: JESU Rankin (R)(M)   Prior study comparison: May 5, 2016, mammo diagnostic right W CAD   performed at [address]  April 28, 2016,   mammo screening bilateral W DBT and CAD, [place] [address] [facility]  2014, mammo screening    bilateral  2014, mammo diagnostic bilateral W DBT   2013, mammo    screening bilateral      There are scattered fibroglandular densities  No dominant soft tissue mass, architectural distortion or    suspicious calcifications are noted in either breast  The skin    and nipple contours are within normal limits  No evidence of malignancy  No significant changes when compared with prior studies  ACR BI-RADSï¾® Assessments: BiRad:1 - Negative     Recommendation:   Routine screening mammogram of both breasts in 1 year  A    reminder letter will be scheduled  Analyzed by CAD     8-10% of cancers will be missed on mammography  Management of a    palpable abnormality must be based on clinical grounds  Patients   will be notified of their results via letter from our facility  Accredited by [employer] and FDA       Transcription Location: JESU Tucker 98: [id number]     Risk Value(s):   Tyrer-Cuzick 10 Year: 2 900%, Tyrer-Cuzick Lifetime: 11 100%,    Myriad Table: 1 5%, DELLA 5 Year: 1 1%, NCI Lifetime: 8 7%   Signed by:   Lane Mix MD   5/1/17       Signatures   Electronically signed by : Jazz Ventura MD; May  2 2017 11:44AM EST                       (Author)

## 2018-01-10 NOTE — RESULT NOTES
Discussion/Summary   normal thyroid and lyme test     - Dr Chadwick Benitez      Verified Results  (1) TSH WITH FT4 REFLEX [id number] 02:36PM Nadine Sarkar     Test Name Result Flag Reference   TSH 1 600 uIU/mL  0 358-3 740   Patients undergoing fluorescein dye angiography may retain small amounts of fluorescein in the body for 48-72 hours post procedure  Samples containing fluorescein can produce falsely depressed TSH values  If the patient had this procedure,a specimen should be resubmitted post fluorescein clearance  The recommended reference ranges for TSH during pregnancy are as follows:  First trimester 0 1 to 2 5 uIU/mL  Second trimester  0 2 to 3 0 uIU/mL  Third trimester 0 3 to 3 0 uIU/m     (1) LYME ANTIBODY, WESTERN BLOT [id number] 02:36PM Chiquita Sarkar Order Number: [id number]     Test Name Result Flag Reference   LYME 18 KD IGG Absent     LYME 23 KD IGG Absent     LYME 28 KD IGG Absent     LYME 30 KD IGG Absent     LYME 39 KD IGG Absent     LYME 41 KD IGG Present A    LYME 45 KD IGG Absent     LYME 58 KD IGG Absent     LYME 66 KD IGG Absent     LYME 93 KD IGG Absent     LYME 23 KD IGM Absent     LYME 39 KD IGM Absent     LYME 41 KD IGM Absent     LYME IGG WB INTERP  Negative     Positive: 5 of the following                                 Borrelia-specific bands:                                 18,23,28,30,39,41,45,58,                                 66, and 93  Negative: No bands or banding                                 patterns which do not                                 meet positive criteria  LYME IGM WB INTERP  Negative     Note: An equivocal or positive EIA result followed by a negative  Western Blot result is considered NEGATIVE  An equivocal or positive  EIA result followed by a positive Western Blot is considered POSITIVE  by the CDC    Positive: 2 of the following bands: 23,39 or 41  Negative: No bands or banding patterns which do not meet positive  criteria  Criteria for positivity are those recommended by CDC/ASTPHLD   p23=Osp C, r10=qofqcjpae  Note:  Sera from individuals with the following may cross react in the  Lyme Western Blot assays: other spirochetal diseases (periodontal  disease, leptospirosis, relapsing fever, yaws, and pinta);  connective autoimmune (Rheumatoid Arthritis and Systemic Lupus  Erythematosus and also individuals with Antinuclear Antibody);  other infections [facility] Spotted Fever; Mil-Barr Virus,  and Cytomegalovirus)      Performed at:  [address] [place] [address]  [id number]  : Lamar Gamble MD, Phone:  [phone]

## 2018-01-10 NOTE — MISCELLANEOUS
Message  Spoke with patient at [phone] as she was seen in the emergency department on Saturday for evaluation of left lower extremity swelling and purple discoloration of her toes  She did undergo an extensive workup, including a lower extremity Doppler, D-dimer, chest x-ray, and EKG, all of which were negative  It was felt that her symptoms may been due to poor circulation due to her sandals versus Raynaud's phenomenon  She states that she's has continued to have persistent swelling of the lower extremity, and essentially feels swollen all over  I did explain that her generalized swelling would be unlikely to be due to her lupus at this time, and she should follow-up with her primary care physician to evaluate for other causes of lower extremity edema  It is possible that the purple discoloration of her toes may be due to Raynaud's phenomenon, which we will observe for now  She will follow-up as scheduled  She will call if she has any other questions or concerns        Signatures   Electronically signed by : Clarence Salazar DO; Sep 27 2016  2:43PM EST                       (Author)

## 2018-01-10 NOTE — RESULT NOTES
Verified Results  MAMMO DIAGNOSTIC RIGHT W CAD [id number] 03:18PM Masoud Sarkar Order Number: [id number]     Test Name Result Flag Reference   MAMMO DIAGNOSTIC RIGHT W CAD (Report)     Patient History:   Family history of breast cancer in paternal aunt at age 48 or    over  Benign stereotactic core biopsy of the left breast, 2014  Patient has never smoked  Patient's BMI is 27 1  Reason for exam: additional evaluation requested from abnormal    screening  Nodular asymmetry within the medial right breast seen on 3-D    screening mammogram from 4/20/2016  Mammo Diagnostic Right W CAD: May 5, 2016 - Check In #: [de-identified]   Spot compression CC, spot compression MLO, and ML view(s) were    taken of the right breast      Technologist: RT Ruddy(JESU)(M)   Prior study comparison: April 28, 2016, mammo screening bilateral   W DBT and CAD, performed at [address] [place] [address]  2014, mammo screening bilateral  2014, mammo diagnostic   bilateral W DBT  2013, mammo screening bilateral      There are scattered fibroglandular densities  The nodular    opacity seen in the inner right breast on the screening study    becomes slightly less conspicuous with compression imaging but    was felt to persist at least on the compression CC image as a    focal 6 mm density  Ultrasound of the inner right breast was performed  At the 1-2    o'clock position 4 cm from nipple there is a 5 x 5 x 4 mm    complicated cyst  At the 3:00 location, 4 cm from nipple, there    is a 7 x 2 x 7 mm hypoechoic benign appearing circumscribed    smooth bordered nonshadowing nonvascular nodule versus    complicated cyst      I would recommend 6 month follow-up ultrasound of the inner right   breast for reassessment of these probably benign findings  US Breast Right Limited:  May 5, 2016 - Check In #: [de-identified]   Technologist: RT Kimberly (R), RDMS   See above   Probably benign right breast findings ASSESSMENT: BiRad:3 - Probably Benign (Overall)     Recommendation:   Ultrasound of the right breast in 6 months     Analyzed by CAD     Transcription Location: [address]   Signing Station: [id number]     Risk Value(s):   Tyrer-Cuzick 10 Year: 2 852%, Tyrer-Cuzick Lifetime: 11 486%,    Myriad Table: 1 5%, DELLA 5 Year: 1 0%, NCI Lifetime: 8 8%   Signed by:   Kristina Dawson MD   5/5/16       Discussion/Summary   Normal Ultrasound of right breast   ultrasound of right breast in 6 months for follow up   - Dr Chelsea Peacock   Electronically signed by : Maury Tobar MD; May  5 2016  4:38PM EST                       (Author)

## 2018-01-10 NOTE — MISCELLANEOUS
Message  Return to work or school:   Deangelo Alvarezsin is under my professional care  She was seen in my office on 6/10/16   She is able to return to work on  6/11/16       STEFANY Dasilva        Signatures   Electronically signed by : Kermit Cardenas DO; Gold 10 2016  2:24PM EST                       (Author)

## 2018-01-11 NOTE — RESULT NOTES
Verified Results  * CT ABDOMEN PELVIS WO CONTRAST 14Oct2016 07:47PM Nadine Sarkar     Test Name Result Flag Reference   CT ABDOMEN PELVIS WO CONTRAST (Report)     CT ABDOMEN AND PELVIS WITHOUT IV CONTRAST     INDICATION: Left leg swelling     COMPARISON: 7/20/2015     TECHNIQUE: CT examination of the abdomen and pelvis was performed without intravenous contrast  This examination, like all CT scans performed in the [facility], was performed utilizing techniques to minimize radiation dose exposure,    including the use of iterative reconstruction and automated exposure control  Axial, sagittal, and coronal reformatted images were submitted for interpretation  Intravenous contrast was not administered as part of this examination  Enteric contrast    was administered  FINDINGS:     ABDOMEN     LOWER CHEST: There is a stable small left lower lobe lung nodule on series 2, image 8  There is mild gastroesophageal reflux  LIVER/BILIARY TREE: Unremarkable  GALLBLADDER: No calcified gallstones  No pericholecystic inflammatory change  SPLEEN: Unremarkable  PANCREAS: Unremarkable  ADRENAL GLANDS: Unremarkable  KIDNEYS/URETERS: Unremarkable  No hydronephrosis  STOMACH AND BOWEL: There is mild diverticulosis coli  APPENDIX: No findings to suggest appendicitis  ABDOMINOPELVIC CAVITY: No ascites or free intraperitoneal air  No lymphadenopathy  VESSELS: Unremarkable for patient's age  PELVIS     REPRODUCTIVE ORGANS: Patient is status post hysterectomy  URINARY BLADDER: Unremarkable  ABDOMINAL WALL/INGUINAL REGIONS: Unremarkable  OSSEOUS STRUCTURES: No acute fracture or destructive osseous lesion  IMPRESSION:     1  No etiology for leg swelling identified  2  Mild diverticulosis coli  3  4 mm left lower lobe lung nodule, stable for greater than one year         Workstation performed: [id number]     Signed by:   Kodak Rivera MD   10/17/16 Discussion/Summary   CT abdomen showed mild diverticulosis ( small pouches around the colon) No infection  No masses, 4mm lung nodule since last year  No change in size  low risk of cancer   continue to monitor    - Dr Maegan Pineda   Electronically signed by : Erik Richards MD; Oct 18 2016  8:11AM EST                       (Author)

## 2018-01-11 NOTE — PROGRESS NOTES
Assessment    1  Systemic lupus erythematosus (710 0) (M32 9)   2  Myofascial pain syndrome (729 1) (M79 1)   3  Chronic migraine (346 70) (G43 709)   4  Cervical radiculopathy (723 4) (M54 12)   5  Encounter for long term current azathioprine therapy (V58 69) (Z79 899)    Plan    1  (1) C3 COMPLEMENT; Status:Active; Requested for:30Sep2016;    2  (1) C4 COMPLEMENT; Status:Active; Requested for:98Jza1795;    3  (1) CBC/PLT/DIFF; Status:Active; Requested for:30Sep2016;    4  (1) COMPREHENSIVE METABOLIC PANEL; Status:Active; Requested for:30Sep2016;    5  (1) C-REACTIVE PROTEIN; Status:Active; Requested for:30Sep2016;    6  (1) DNA (DS) ANTIBODY; Status:Active; Requested for:30Sep2016;    7  (1) SED RATE; Status:Active; Requested for:30Sep2016;    8  Follow-up visit in 6 weeks Evaluation and Treatment  Follow-up  Status: Hold For -   Scheduling  Requested for: 70Fcv9651    9  Butalbital-APAP-Caffeine -40 MG Oral Tablet; TAKE 1 TABLET TWICE   DAILY AS NEEDED    10  AzaTHIOprine 50 MG Oral Tablet; take 2 tablet daily   11  Call [phone] if: The pain seems worse ; Status:Complete;   Done: 74VBA5289   12  Call [phone] if: You have questions or concerns about your problem ;    Status:Complete;   Done: 84Gne2187    Discussion/Summary    Ms Shanda Casillas is feeling generally well until about 2 weeks ago, at which time she had a flare with increased pain in her entire left side  She continues to have significant pain in her neck and her left shoulder, which has been constant over the last 2 weeks  She also reports continued swelling in her legs  She has noted swelling essentially all over her left side, but she denies any other obvious joint swelling  She is not currently taking any medications for her discomfort  She continues to have mild headaches, for which she has been utilizing Fioricet as needed  She reports morning stiffness typically lasting several hours   She denies any difficulty sleeping because of pain, but she does report nonrestorative sleep and fatigue  On exam, there is mild swelling of the bilateral ankles  There is no other active synovitis  She does have multiple myofascial tender points most notable in the left upper and lower extremity, as well as throughout the spine  Review of laboratory studies revealed an essentially normal CBC and CMP  ESR and CRP were elevated and increased from prior levels  Double-stranded DNA was also elevated at 17, which was increased from prior levels  C3, C4, and TSH were within normal limits  At this time, her SLE does appear mildly active despite azathioprine 50 mg daily  We will increase the azathioprine to 100 mg daily in an effort to gain better control of her symptoms  I have also refilled her fear sent for her headaches  I will not make any other changes to her medication regimen at this time  I would like to recheck a CBC, CMP, ESR, CRP, C3, C4, and double-stranded DNA before her follow-up  I will reevaluate her in 6 weeks  She will call in the interim if there are any questions or concerns  Counseling  Rheumatology Counseling Documentation: The patient was counseled regarding diagnostic results, instructions for management, impressions and risks and benefits of treatment options  Chief Complaint  F/U SLE   Patient is here today for follow up of chronic conditions described in HPI  History of Present Illness  Pt  returns for F/U for SLE  Feeling OK except over the last 2 weeks  Had a flare which increased pain in entire L side  c/o neck pain and L shoulder pain  Pain is constant for the last 2 weeks  + swelling in legs  No pain meds taken at this time  c/o mild HAs as well  + swelling on L side  No other obvious joint swelling  + AM stiffness x several hours  No difficulty sleeping due to pain  + occasional non-restorative sleep  + occasional fatigue       RAPID3: 8 7/30      Review of Systems    Constitutional: chills, but no fever, no recent weight gain, no recent weight loss and no anorexia    The patient presents with complaints of occasional episodes of fatigue  HEENT: blurred vision, dryness of the eyes, erythema eye(s) and dryness mouth, but no double vision, no amaurosis fugax, no mouth sores, not feeling congested and no sore throat    The patient presents with complaints of nocturnal episodes of bilateral eye pain  Cardiovascular: dyspnea on exertion and swelling in the arms or legs, but no chest pain or pressure  Respiratory: no unusual or persistent cough, no shortness of breath and no pleurisy  Gastrointestinal: no nausea, no vomiting, no heartburn, no diarrhea, no constipation, no melena and no BRBPR    The patient presents with complaints of left > right abdominal pain, described as dull  Genitourinary: no foamy urine, but no dysuria and no hematuria  Musculoskeletal: as noted in HPI  Integumentary Raynaud's, alopecia and nail changes, but no rash and no photosensitivity  Endocrine polydipsia, but no polyuria  Hematologic/Lymphatic: no unusual bleeding and no tendency for easy bruising  Neurological: headache and tingling, but no vertigo or dizziness and no weakness  Psychiatric: non-restorative sleep, but no insomnia  Active Problems    1  Carpal tunnel syndrome on left (354 0) (G56 02)   2  Cervical radiculopathy (723 4) (M54 12)   3  Cervicalgia (723 1) (M54 2)   4  Chest pain (786 50) (R07 9)   5  Encounter for long term current azathioprine therapy (V58 69) (Z79 899)   6  Migraine headache (346 90) (G43 909)   7  Myofascial pain syndrome (729 1) (M79 1)   8  Numbness and tingling in left hand (782 0) (R20 2)   9  Partial tear of right rotator cuff (840 4) (M75 111)   10  Right shoulder pain (719 41) (M25 511)   11  Rotator cuff syndrome, right (726 10) (M75 101)   12  Systemic lupus erythematosus (710 0) (M32 9)    Past Medical History    1  History of Abnormal mammogram of right breast (793 80) (R92 8)   2  History of Arthralgia of multiple joints (719 49) (M25 50)   3  History of systemic lupus erythematosus (SLE) (V12 29) (Z87 39)   4  History of Lipid screening (V77 91) (Z13 220)   5  Migraine headache (346 90) (G43 909)   6  History of Visit for screening mammogram (V76 12) (Z12 31)    The active problems and past medical history were reviewed and updated today  Surgical History    1  History of Hysterectomy    The surgical history was reviewed and updated today  Family History  Mother    1  Family history of essential hypertension (V17 49) (Z82 49)  Sister    2  Family history of rheumatoid arthritis (V17 7) (Z82 61)  Maternal Relatives    3  Family history of systemic lupus erythematosus (V19 4) (Z82 69)  Family History    4  Family history of chronic obstructive pulmonary disease (V17 6) (Z82 5)   5  Denied: Family history of Crohn's disease   6  Denied: Family history of psoriasis   7  Denied: Family history of ulcerative colitis    The family history was reviewed and updated today  Social History    · Employed   · Never a smoker   · No alcohol use   · No drug use   · Non-smoker (V49 89) (Z78 9)   · Denied: History of Social alcohol use  The social history was reviewed and updated today  The social history was reviewed and is unchanged  Current Meds   1  Amitriptyline HCl - 25 MG Oral Tablet; TAKE 1 TABLET AT BEDTIME; Therapy: 81JMJ4113 to (Evaluate:10Jan2017)  Requested for: 13RWT1366; Last   Rx:95Mxs8062 Ordered   2  AzaTHIOprine 50 MG Oral Tablet; Take 1 tablet daily; Therapy: 88EEN2393 to (Evaluate:56Box1585)  Requested for: 59HLB4933; Last   Rx:16Jun2016 Ordered   3  Biotin TABS; Take 1 tablet daily; Therapy: (Recorded:20Zzx7708) to Recorded   4  Butalbital-APAP-Caffeine -40 MG Oral Tablet; TAKE 1 TABLET TWICE DAILY AS   NEEDED  Requested for: 65Zmg2257; Last Rx:68Dop1855 Ordered   5  Flonase 50 MCG/ACT SUSP; 2 PUFFS EACH NOSTRIL DAILY;    Therapy: (Recorded:03Xxr1191) to Recorded   6  Vitamin D 2000 UNIT Oral Capsule; take 1 capsule daily; Therapy: (Recorded:52Vns8441) to Recorded    The medication list was reviewed and updated today  Immunizations  Influenza --- Christin Breanna: 04-Jan-2016   Tdap --- Suellyn Breanna: 04-Jan-2016     Allergies    1  No Known Drug Allergies    Vitals  Signs   Recorded: 38VIF4006 25:39ZF   Systolic: 758  Diastolic: 90  Heart Rate: 68  Weight: 184 lb   BMI Calculated: 35 24  BSA Calculated: 1 81    Physical Exam    Constitutional   General appearance: Abnormal   obese  Eyes   Conjunctiva and lids: No swelling, erythema or discharge  Pupils and irises: Equal, round and reactive to light  Ears, Nose, Mouth, and Throat   External inspection of ears and nose: Normal     Oropharynx: Abnormal   Oropharynx examination showed a(n) ~Umm ulcer on the right side of the hard palate  Pulmonary   Respiratory effort: No increased work of breathing or signs of respiratory distress  Auscultation of lungs: Clear to auscultation  Cardiovascular   Auscultation of heart: Normal rate and rhythm, normal S1 and S2, without murmurs  Examination of extremities for edema and/or varicosities: Abnormal   bilateral ankle 1+ pitting edema and bilateral pretibial 1+ pitting edema  Lymphatic   Palpation of lymph nodes in neck: No lymphadenopathy  Psychiatric   Orientation to person, place, and time: Normal     Mood and affect: Normal         Right ankle swelling  Left ankle swelling  Musculoskeletal - Joints, bones, and muscles: Abnormal  Palpation - bilateral knee crepitus  Skin - Skin and subcutaneous tissue: Normal    Neurologic - Sensation: Normal    Additional Findings - + multiple myofascial tender points        Results/Data  (1) C3 COMPLEMENT 37Rke7154 04:49PM [facility] Order Number: [id number]     Test Name Result Flag Reference   C3 COMPLEMENT 111 mg/dL  82 - 167   Performed at:  [address] [place] [address] [id number]  : Emily Schumacher MD, Phone:  [phone]     (1) C4 COMPLEMENT 65UJR4133 04:49PM Sameera Cure   TW Order Number: [id number]     Test Name Result Flag Reference   C4 COMPLEMENT 30 mg/dL  14 - 44   Performed at:  [address] [place] [address]  [id number]  : Emily Schumacher MD, Phone:  [phone]     (1) CBC/PLT/DIFF 04WNM9815 04:49PM Sameera Cure   TW Order Number: [id number]     Test Name Result Flag Reference   WBC COUNT 5 19 Thousand/uL  4 31-10 16   RBC COUNT 4 98 Million/uL  3 81-5 12   HEMOGLOBIN 14 2 g/dL  11 5-15 4   HEMATOCRIT 44 0 %  34 8-46  1   MCV 88 fL  82-98   MCH 28 5 pg  26 8-34 3   MCHC 32 3 g/dL  31 4-37 4   RDW 15 4 % H 11 6-15 1   MPV 11 3 fL  8 9-12 7   PLATELET COUNT 635 Thousands/uL  149-390   NEUTROPHILS RELATIVE PERCENT 56 %  43-75   LYMPHOCYTES RELATIVE PERCENT 34 %  14-44   MONOCYTES RELATIVE PERCENT 8 %  4-12   EOSINOPHILS RELATIVE PERCENT 2 %  0-6   BASOPHILS RELATIVE PERCENT 0 %  0-1   NEUTROPHILS ABSOLUTE COUNT 2 92 Thousands/?L  1 85-7 62   LYMPHOCYTES ABSOLUTE COUNT 1 74 Thousands/?L  0 60-4 47   MONOCYTES ABSOLUTE COUNT 0 42 Thousand/?L  0 17-1 22   EOSINOPHILS ABSOLUTE COUNT 0 09 Thousand/?L  0 00-0 61   BASOPHILS ABSOLUTE COUNT 0 02 Thousands/?L  0 00-0 10     (1) COMPREHENSIVE METABOLIC PANEL 58ECB2293 49:57PN Sameera Cure   TW Order Number: [id number]     Test Name Result Flag Reference   GLUCOSE,RANDM 86 mg/dL     If the patient is fasting, the ADA then defines impaired fasting glucose as > 100 mg/dL and diabetes as > or equal to 123 mg/dL     SODIUM 143 mmol/L  136-145   POTASSIUM 4 3 mmol/L  3 5-5 3   CHLORIDE 105 mmol/L  100-108   CARBON DIOXIDE 28 mmol/L  21-32   ANION GAP (CALC) 10 mmol/L  4-13   BLOOD UREA NITROGEN 13 mg/dL  5-25   CREATININE 0 82 mg/dL  0 60-1 30   Standardized to IDMS reference method   CALCIUM 9 0 mg/dL  8 3-10 1   BILI, TOTAL 0 30 mg/dL  0 20-1 00 ALK PHOSPHATAS 131 U/L H    ALT (SGPT) 63 U/L  12-78   AST(SGOT) 44 U/L  5-45   ALBUMIN 3 9 g/dL  3 5-5 0   TOTAL PROTEIN 7 2 g/dL  6 4-8 2   eGFR Non-African American      >60 0 ml/min/1 73sq m   [facility] Disease Education Program recommendations are as follows:  GFR calculation is accurate only with a steady state creatinine  Chronic Kidney disease less than 60 ml/min/1 73 sq  meters  Kidney failure less than 15 ml/min/1 73 sq  meters  (1) C-REACTIVE PROTEIN 05Fbw3648 04:49PM Eldridge Quarto   TW Order Number: [id number]     Test Name Result Flag Reference   C-REACT PROTEIN 8 4 mg/L H <3 0     (1) DNA (DS) ANTIBODY 55UBO5429 04:49PM Eldridge Quarto   TW Order Number: [id number]     Test Name Result Flag Reference   DNA (DS) ANTIB 17 IU/mL H 0 - 9   Negative      <5                                     Equivocal  5 - 9                                     Positive      >9    Performed at:  [address] [place] [address]  [id number]  : Ewelina Garcia MD, Phone:  [phone]     (1) SED RATE 68BEY6513 04:49PM Eldridge Quarto   TW Order Number: [id number]     Test Name Result Flag Reference   SED RATE 27 mm/hour H 0-20     (1) TSH WITH FT4 REFLEX 30Scc7094 04:49PM Eldridge Quarto   TW Order Number: [id number]     Test Name Result Flag Reference   TSH 3 533 uIU/mL  0 358-3 740   Patients undergoing fluorescein dye angiography may retain small amounts of fluorescein in the body for 48-72 hours post procedure  Samples containing fluorescein can produce falsely depressed TSH values  If the patient had this procedure,a specimen should be resubmitted post fluorescein clearance            The recommended reference ranges for TSH during pregnancy are as follows:  First trimester 0 1 to 2 5 uIU/mL  Second trimester  0 2 to 3 0 uIU/mL  Third trimester 0 3 to 3 0 uIU/m       Future Appointments    Date/Time Provider Specialty Site 11/14/2016 03:00 PM Nadine Sarkar MD [facility] [address]   10/14/2016 03:00 PM Miranda Buchanan DO [facility] [address] [facility]     Signatures   Electronically signed by : Adele Hameed DO; Aug 26 2016  4:55PM EST                       (Author)

## 2018-01-11 NOTE — MISCELLANEOUS
Message  Return to work or school:   Denisa Elliott is under my professional care  She was seen in my office on 8/26/16   She is able to return to work on  9/2/16       STEFANY Villar        Signatures   Electronically signed by : Ren Sotelo DO; Sep  1 2016 10:23AM EST                       (Author)

## 2018-01-11 NOTE — RESULT NOTES
Discussion/Summary   cholesterol looks good! diabetes test was negative for diabetes   - Dr Lisa Drake      Verified Results  (1) LIPID PANEL, FASTING 83JQO1540 09:42AM Nadine Sarkar Order Number: [id number]     Test Name Result Flag Reference   CHOLESTEROL 170 mg/dL     HDL,DIRECT 50 mg/dL  40-60   Specimen collection should occur prior to Metamizole administration due to the potential for falsely depressed results  LDL CHOLESTEROL CALCULATED 92 mg/dL  0-100   Triglyceride:         Normal              <150 mg/dl       Borderline High    150-199 mg/dl       High               200-499 mg/dl       Very High          >499 mg/dl  Cholesterol:         Desirable        <200 mg/dl      Borderline High  200-239 mg/dl      High             >239 mg/dl  HDL Cholesterol:        High    >59 mg/dL      Low     <41 mg/dL  LDL CALCULATED:    This screening LDL is a calculated result  It does not have the accuracy of the Direct Measured LDL in the monitoring of patients with hyperlipidemia and/or statin therapy  Direct Measure LDL (BWJ569) must be ordered separately in these patients  TRIGLYCERIDES 141 mg/dL  <=150   Specimen collection should occur prior to N-Acetylcysteine or Metamizole administration due to the potential for falsely depressed results  (1) HEMOGLOBIN A1C 03Jun2017 09:42AM Jyoti Sarkar Order Number: [id number]     Test Name Result Flag Reference   HEMOGLOBIN A1C 5 9 %  4 2-6 3   EST  AVG   GLUCOSE 123 mg/dl

## 2018-01-12 NOTE — RESULT NOTES
Verified Results  MAMMO SCREENING BILATERAL W 3D & CAD 28Apr2016 04:12PM [facility], [place] Order Number: [id number]     Test Name Result Flag Reference   MAMMO SCREENING BILATERAL W 3D & CAD (Report)     Patient History:   Family history of breast cancer in paternal aunt at age 48 or    over  Benign stereotactic core biopsy of the left breast, 2014  Patient has never smoked  Patient's BMI is 27 1  Reason for exam: screening (asymptomatic)  Screening     Mammo Screening Bilateral W DBT and CAD: April 28, 2016 - Check    In #: [de-identified]   2D/3D Procedure   3D views: Bilateral MLO view(s) were taken  2D views: Bilateral CC view(s) were taken  Technologist: JESU Ramon (R)(M)   Prior study comparison: 2014, mammo screening bilateral  2014,    mammo diagnostic bilateral W DBT  2013, mammo screening    bilateral  2011, mammo screening bilateral  2009, mammo    screening bilateral  2008, mammo screening bilateral      There are scattered fibroglandular densities  A combination of    mediolateral oblique 3-D tomographic slices as well as standard    two-dimensional orthogonal images were obtained  No dominant soft   tissue mass, architectural distortion or suspicious    calcifications are noted in either breast  The skin and nipple    contours are within normal limits  Biopsy marker clip in the    left breast      In the inner right breast at the 3 o'clock position there is a    new partially obscured partially circumscribed asymmetric nodular   density which should be further assessed with spot compression    imaging and perhaps ultrasound  The margins remain at least    partly ill-defined/obscured on tomographic imaging  ASSESSMENT: BiRad:0 - Incomplete: needs additional imaging    evaluation - Right     Recommendation:   Further imaging of the right breast    A breast health care nurse from our facility will be contacting    the patient regarding the need for additional imaging     8-10% of cancers will be missed on mammography  Management of a    palpable abnormality must be based on clinical grounds  Patients    will be notified of their results via letter from our facility  Accredited by [employer] of Radiology and FDA  Transcription Location: JESU Tucker 98: [id number]     Risk Value(s):   Tyrer-Cuzick 10 Year: 2 852%, Tyrer-Cuzick Lifetime: 11 486%,    Myriad Table: 1 5%, DELLA 5 Year: 1 0%, NCI Lifetime: 8 8%     (1) LUPUS ANTICOAGULANT PROFILE 09Apr2016 10:26AM Latisha Sarkar Marker Order Number: [id number]     Test Name Result Flag Reference   PTT LUPUS ANTICOAGULANT      DILUTE DAVID VIPER VENOM TIME      DILUTE PROTHROMBIN TIME(DPT)      THROMBIN TIME (DRVW)      SEE WRITTEN REPORT FROM [facility]   DPT CONFIRM RATIO          Plan  Abnormal mammogram of right breast    · *US BREAST RIGHT LIMITED (DIAGNOSTIC); Status:Active; Requested for:29Apr2016;    · MAMMO DIAGNOSTIC RIGHT W CAD; Status:Active;  Requested for:29Apr2016;     Signatures   Electronically signed by : Delvin Murray MD; May  3 2016  8:12AM EST                       (Author)

## 2018-01-12 NOTE — PROGRESS NOTES
Assessment    1  Systemic lupus erythematosus (710 0) (M32 9)   2  Migraine headache (346 90) (G43 909)   3  Myofascial pain syndrome (729 1) (M79 1)   4  Encounter for long term current azathioprine therapy (V58 69) (T09 186)    Plan    1  Amitriptyline HCl - 25 MG Oral Tablet; TAKE 1 TABLET AT BEDTIME   2  (1) C3 COMPLEMENT; Status:Active; Requested for:22Xyj0176;    3  (1) C4 COMPLEMENT; Status:Active; Requested for:48Zva8129;    4  (1) CBC/PLT/DIFF; Status:Active; Requested for:29Fpv2265;    5  (1) COMPREHENSIVE METABOLIC PANEL; Status:Active; Requested for:71Dsj0837;    6  (1) C-REACTIVE PROTEIN; Status:Active; Requested for:56Cst1487;    7  (1) DNA (DS) ANTIBODY; Status:Active; Requested for:86Kvp8483;    8  (1) SED RATE; Status:Active; Requested for:55Twj5721;    9  (1) TSH WITH FT4 REFLEX; Status:Active; Requested for:95Fdo9540;    10  Follow-up visit in 6 weeks Evaluation and Treatment  Follow-up  Status: Hold For -    Scheduling  Requested for: 33AJL2918    11  AzaTHIOprine 50 MG Oral Tablet; Take 1 tablet daily   12  Call [phone] if: The symptoms seem worse ; Status:Complete;   Done:    72EKJ6127   13  Call [phone] if: You have questions or concerns about your problem ;    Status:Complete;   Done: 60YNK0817    Discussion/Summary    Ms Erich Simms has been feeling better since her last evaluation  She does report that she had some increased fatigue and felt washed out for the first week after starting the azathioprine, but this has been improving  She does report that her overall swelling has decreased  She denies any significant joint pain at this time, but she still has some persistent fatigue  She was seen in the emergency department last week for a severe migraine  She does report morning stiffness, but she does feel that it has improved since her last evaluation   She did explain that she feels that her migraines have been increasing, and she is unsure if this is due to her cervical herniated disc  She is going to physical therapy for her right shoulder pain and neck pain at this time  She does report some difficulty sleeping because of the neck pain, and occasional nonrestorative sleep  On exam, there is no active synovitis  The previously noted tenderness to palpation in her bilateral hands and ankles has resolved  She does have 1+ bilateral lower extremity edema  Review of laboratory studies revealed a normal CBC, CMP, C3, C4, and urinalysis  Urine protein to creatinine ratio was mildly elevated at 0 3  CRP was mildly elevated at 4 9, which is improved from a prior level    ESR was within normal limits  Double-stranded DNA was mildly positive at 15  At this time, her SLE does appear mildly active but improved with the azathioprine  We will continue the azathioprine 50 mg daily for the next 6 weeks to see if it will have any additional efficacy  If we do not see any additional improvement, we may consider increasing the dose  For her chronic migraines, I will add amitriptyline 25 mg daily at bedtime to see if this will give her any improvement  I would like to recheck a CBC, CMP, ESR, CRP, double-stranded DNA, C3, and C4 before her follow-up  I will reevaluate her in 6 weeks  She will call in the interim if there are any questions or concerns  Counseling  Rheumatology Counseling Documentation: The patient was counseled regarding diagnostic results, instructions for management, impressions and risks and benefits of treatment options  Chief Complaint  F/U SLE   Patient is here today for follow up of chronic conditions described in HPI  History of Present Illness  Pt  returns for F/U for SLE  Feeling better since last visit  Had increased fatigue and felt washed out for first week after starting AZA  Swelling has decreased  Was seen in ED last week for migraine  No significant joint pain at this time  Still with persistent fatigue  + AM stiffness -> improved since last visit   Going to PT for R shoulder pain  Migraines seem to be increasing  + difficulty sleeping due to neck pain  + occasional non-restorative sleep  RAPID3: not completed      Review of Systems    Constitutional: fatigue, but no fever, no recent weight gain, no chills, no recent weight loss and no anorexia  HEENT: blurred vision, dryness of the eyes, erythema eye(s), dryness mouth and feeling congested, but no double vision, no amaurosis fugax, no eye pain and no sore throat    The patient presents with complaints of occasional episodes of mouth sores  Cardiovascular: swelling in the arms or legs, but no chest pain or pressure and no dyspnea on exertion  Respiratory: no unusual or persistent cough, no shortness of breath and no pleurisy  Gastrointestinal: no nausea, no vomiting, no heartburn, no diarrhea, no constipation, no melena and no BRBPR    The patient presents with complaints of abdominal pain starting about 1 day ago  Symptoms are resolved  Genitourinary: no foamy urine and increased frequency, but no dysuria and no hematuria  Musculoskeletal: as noted in HPI  Integumentary Raynaud's, alopecia and nail changes, but no rash and no photosensitivity  Endocrine polydipsia, but no polyuria  Hematologic/Lymphatic: a tendency for easy bruising, but no unusual bleeding  Neurological: headache, tingling and weakness, but no vertigo or dizziness  Psychiatric: insomnia and non-restorative sleep  Active Problems    1  Carpal tunnel syndrome on left (354 0) (G56 02)   2  Cervical radiculopathy (723 4) (M54 12)   3  Chest pain (786 50) (R07 9)   4  Encounter for long term current azathioprine therapy (V58 69) (Z79 899)   5  Migraine headache (346 90) (G43 909)   6  Myofascial pain syndrome (729 1) (M79 1)   7  Numbness and tingling in left hand (782 0) (R20 2)   8  Partial tear of right rotator cuff (840 4) (M75 111)   9  Right shoulder pain (719 41) (M25 511)   10   Systemic lupus erythematosus (710 0) (M32 9)    Past Medical History    1  History of Abnormal mammogram of right breast (793 80) (R92 8)   2  History of Arthralgia of multiple joints (719 49) (M25 50)   3  History of systemic lupus erythematosus (SLE) (V12 29) (Z87 39)   4  History of Lipid screening (V77 91) (Z13 220)   5  Migraine headache (346 90) (G43 909)   6  History of Visit for screening mammogram (V76 12) (Z12 31)    The active problems and past medical history were reviewed and updated today  Surgical History    1  History of Hysterectomy    The surgical history was reviewed and updated today  Family History  Mother    1  Family history of essential hypertension (V17 49) (Z82 49)  Sister    2  Family history of rheumatoid arthritis (V17 7) (Z82 61)  Maternal Relatives    3  Family history of systemic lupus erythematosus (V19 4) (Z82 69)  Family History    4  Family history of chronic obstructive pulmonary disease (V17 6) (Z82 5)   5  Denied: Family history of Crohn's disease   6  Denied: Family history of psoriasis   7  Denied: Family history of ulcerative colitis    The family history was reviewed and updated today  Social History    · Employed   · Never a smoker   · No alcohol use   · No drug use   · Non-smoker (V49 89) (Z78 9)   · Denied: History of Social alcohol use  The social history was reviewed and updated today  The social history was reviewed and is unchanged  Current Meds   1  AzaTHIOprine 50 MG Oral Tablet; Take 1 tablet daily; Therapy: 18ELR7085 to (Evaluate:83Zvh6492)  Requested for: 03OAN7656; Last   Rx:16Jun2016 Ordered   2  Biotin TABS; Take 1 tablet daily; Therapy: (Recorded:75Mtt0386) to Recorded   3  Butalbital-APAP-Caffeine -40 MG Oral Tablet; TAKE 1 TABLET TWICE DAILY AS   NEEDED  Requested for: 30FXS7535; Last Rx:27Xpj4302 Ordered   4  Flonase 50 MCG/ACT SUSP; 2 PUFFS EACH NOSTRIL DAILY; Therapy: (Recorded:09Dan1156) to Recorded   5   Meloxicam 15 MG Oral Tablet; TAKE 1 TABLET DAILY AS NEEDED; Therapy: 15Apr2016 to (Evaluate:85Lkw6099)  Requested for: 15Apr2016; Last   Rx:72Poz7331 Ordered   6  Vitamin D 2000 UNIT Oral Capsule; take 1 capsule daily; Therapy: (Recorded:15Uvx2683) to Recorded    The medication list was reviewed and updated today  Immunizations  Influenza --- Erven Brod: 76JVY0592   Tdap --- Series1: 68WLF1348     Allergies    1  No Known Drug Allergies    Vitals  Signs [Data Includes: Current Encounter]   Recorded: 92Klc5828 03:21PM   Heart Rate: 66  Systolic: 503  Diastolic: 80  Weight: 917 lb   BMI Calculated: 34 85  BSA Calculated: 1 81    Physical Exam    Constitutional   General appearance: Abnormal   obese  Eyes   Conjunctiva and lids: No swelling, erythema or discharge  Pupils and irises: Equal, round and reactive to light  Ears, Nose, Mouth, and Throat   External inspection of ears and nose: Normal     Oropharynx: Abnormal   Oropharynx examination showed a(n) ~Umm ulcer on the right side of the hard palate  Pulmonary   Respiratory effort: No increased work of breathing or signs of respiratory distress  Auscultation of lungs: Clear to auscultation  Cardiovascular   Auscultation of heart: Normal rate and rhythm, normal S1 and S2, without murmurs  Examination of extremities for edema and/or varicosities: Abnormal   bilateral ankle 1+ pitting edema and bilateral pretibial 1+ pitting edema  Lymphatic   Palpation of lymph nodes in neck: No lymphadenopathy  Psychiatric   Orientation to person, place, and time: Normal     Mood and affect: Normal         Right ankle swelling  Left ankle swelling  Musculoskeletal - Joints, bones, and muscles: Abnormal  Palpation - bilateral knee crepitus  Skin - Skin and subcutaneous tissue: Normal    Neurologic - Sensation: Normal    Additional Findings - + multiple myofascial tender points        Results/Data  Results   (1) C3 COMPLEMENT 46LOY2851 11:37AM [email or web address]    Order Number: [id number] Test Name Result Flag Reference   C3 COMPLEMENT 128 mg/dL  82 - 167   Performed at:  [address] [place] [address]  [id number]  : Christie Reyes MD, Phone:  [phone]     (1) C4 COMPLEMENT 62NNE7246 11:37AM [employer]   TW Order Number: [id number]     Test Name Result Flag Reference   C4 COMPLEMENT 36 mg/dL  14 - 44   Performed at:  [address] [place] [address]  [id number]  : Christie Reyes MD, Phone:  [phone]     (1) CBC/PLT/DIFF 99OXF2587 11:37AM [employer]   TW Order Number: [id number]  TW Order Number: [id number]     Test Name Result Flag Reference   WBC COUNT 4 44 Thousand/uL  4 31-10 16   RBC COUNT 4 92 Million/uL  3 81-5 12   HEMOGLOBIN 13 9 g/dL  11 5-15 4   HEMATOCRIT 43 8 %  34 8-46  1   MCV 89 fL  82-98   MCH 28 3 pg  26 8-34 3   MCHC 31 7 g/dL  31 4-37 4   RDW 15 0 %  11 6-15 1   MPV 11 0 fL  8 9-12 7   PLATELET COUNT 350 Thousands/uL  149-390   nRBC AUTOMATED 0 /100 WBCs     NEUTROPHILS RELATIVE PERCENT 53 %  43-75   LYMPHOCYTES RELATIVE PERCENT 37 %  14-44   MONOCYTES RELATIVE PERCENT 8 %  4-12   EOSINOPHILS RELATIVE PERCENT 2 %  0-6   BASOPHILS RELATIVE PERCENT 0 %  0-1   NEUTROPHILS ABSOLUTE COUNT 2 36 Thousands/?L  1 85-7 62   LYMPHOCYTES ABSOLUTE COUNT 1 63 Thousands/?L  0 60-4 47   MONOCYTES ABSOLUTE COUNT 0 34 Thousand/?L  0 17-1 22   EOSINOPHILS ABSOLUTE COUNT 0 09 Thousand/?L  0 00-0 61   BASOPHILS ABSOLUTE COUNT 0 01 Thousands/?L  0 00-0 10     (1) COMPREHENSIVE METABOLIC PANEL 34UYM9514 63:97GQ [employer]   TW Order Number: [id number]  TW Order Number: [id number] Order Number: [id number]     Test Name Result Flag Reference   GLUCOSE,RANDM 85 mg/dL     If the patient is fasting, the ADA then defines impaired fasting glucose as > 100 mg/dL and diabetes as > or equal to 123 mg/dL     SODIUM 141 mmol/L  136-145   POTASSIUM 3 7 mmol/L 3 5-5 3   CHLORIDE 105 mmol/L  100-108   CARBON DIOXIDE 29 mmol/L  21-32   ANION GAP (CALC) 7 mmol/L  4-13   BLOOD UREA NITROGEN 13 mg/dL  5-25   CREATININE 0 92 mg/dL  0 60-1 30   Standardized to IDMS reference method   CALCIUM 9 0 mg/dL  8 3-10 1   BILI, TOTAL 0 41 mg/dL  0 20-1 00   ALK PHOSPHATAS 125 U/L H    ALT (SGPT) 67 U/L  12-78   AST(SGOT) 31 U/L  5-45   ALBUMIN 3 7 g/dL  3 5-5 0   TOTAL PROTEIN 7 8 g/dL  6 4-8 2   eGFR Non-African American      >60 0 ml/min/1 73sq m   [facility] Disease Education Program recommendations are as follows:  GFR calculation is accurate only with a steady state creatinine  Chronic Kidney disease less than 60 ml/min/1 73 sq  meters  Kidney failure less than 15 ml/min/1 73 sq  meters  (1) C-REACTIVE PROTEIN 62NIS8928 11:37AM [facility] Order Number: [id number]  TW Order Number: [id number] Order Number: [id number]     Test Name Result Flag Reference   C-REACT PROTEIN 4 9 mg/L H <3 0     (1) DNA (DS) ANTIBODY 26GHC4940 11:37AM [facility] Order Number: [id number]     Test Name Result Flag Reference   DNA (DS) ANTIB 15 IU/mL H 0 - 9   Negative      <5                                   Equivocal  5 - 9                                   Positive      >9    Performed at:  [address] [place] [address]  [id number]  : Jaimee Fairbanks MD, Phone:  [phone]     (1) SED RATE 20ZXC9176 11:37AM [facility] Order Number: [id number]     Test Name Result Flag Reference   SED RATE 21 mm/hour H 0-20     (1) CBC/PLT/DIFF 52YLD5687 02:42PM [facility] [employer]     Test Name Result Flag Reference   WBC COUNT 5 41 Thousand/uL  4 31-10 16   RBC COUNT 5 17 Million/uL H 3 81-5 12   HEMOGLOBIN 14 5 g/dL  11 5-15 4   HEMATOCRIT 44 8 %  34 8-46  1   MCV 87 fL  82-98   MCH 28 0 pg  26 8-34 3   MCHC 32 4 g/dL  31 4-37 4   RDW 15 1 %  11 6-15 1   MPV 10 3 fL  8 9-12 7   PLATELET COUNT 229 Thousands/uL  149-390   NEUTROPHILS RELATIVE PERCENT 60 %  43-75   LYMPHOCYTES RELATIVE PERCENT 33 %  14-44   MONOCYTES RELATIVE PERCENT 7 %  4-12   EOSINOPHILS RELATIVE PERCENT 0 %  0-6   BASOPHILS RELATIVE PERCENT 0 %  0-1   NEUTROPHILS ABSOLUTE COUNT 3 23 Thousands/?L  1 85-7 62   LYMPHOCYTES ABSOLUTE COUNT 1 77 Thousands/?L  0 60-4 47   MONOCYTES ABSOLUTE COUNT 0 37 Thousand/?L  0 17-1 22   EOSINOPHILS ABSOLUTE COUNT 0 02 Thousand/?L  0 00-0 61   BASOPHILS ABSOLUTE COUNT 0 02 Thousands/?L  0 00-0 10     (1) URINALYSIS (will reflex a microscopy if leukocytes, occult blood, protein or nitrites are not within normal limits) 55SON6011 02:42PM Demetri Sandoval     Test Name Result Flag Reference   COLOR Yellow     CLARITY Clear     SPECIFIC GRAVITY UA <=1 005  1 003-1 030   PH UA 6 5  4 5-8 0   LEUKOCYTE ESTERASE UA Negative  Negative   NITRITE UA Negative  Negative   PROTEIN UA Negative mg/dl  Negative   GLUCOSE UA Negative mg/dl  Negative   KETONES UA Negative mg/dl  Negative   UROBILINOGEN UA 0 2 E U /dl  0 2, 1 0 E U /dl   BILIRUBIN UA Negative  Negative   BLOOD UA Negative  Negative     (1) URINE PROTEIN CREATININE RATIO 10Jun2016 02:42PM Demetri Sandoval     Test Name Result Flag Reference   CREATININE URINE 19 1 mg/dL     URINE PROTEIN:CREATININE RATIO <0 31 H 0 00-0 10   URINE PROTEIN <6 mg/dL         Future Appointments    Date/Time Provider Specialty Site   11/14/2016 03:00 PM Sandy Sarkar MD [facility] [address]   08/26/2016 03:00 PM Tamica Neves [facility]   07/19/2016 03:45 PM VIDHI Vann   Orthopedic Surgery [facility]     Signatures   Electronically signed by : Kranthi Mullen DO; Jul 14 2016  4:45PM EST                       (Author)

## 2018-01-13 VITALS
BODY MASS INDEX: 33.51 KG/M2 | HEIGHT: 61 IN | HEART RATE: 88 BPM | DIASTOLIC BLOOD PRESSURE: 85 MMHG | WEIGHT: 177.5 LBS | SYSTOLIC BLOOD PRESSURE: 122 MMHG

## 2018-01-13 NOTE — RESULT NOTES
Discussion/Summary   negative doppler  no blood clots   - Dr Pete Nolan      Verified Results  Faraz Berger, UNILATERAL/LIMITED 72PSF6533 04:01PM Katharina Sarkar Order Number: [id number]   Performing Comments: LEFT CALF PAIN, SWELLING   - Patient Instructions: To schedule this appointment, please contact Central Scheduling at [phone]  Test Name Result Flag Reference   VAS LOWER LIMB VENOUS DUPLEX STUDY, UNILATERAL/LIMITED (Report)     [facility] REPORT   CLINICAL:   Indications: Left Swelling of Limb [R22 4]  The patient presents with   intermittent left leg swelling  The patient has a history of Lupus  Operative History:   Hysterectomy   Risk Factors: The patient has no history of obesity, DVT, limb trauma or   malignancy  Clinical:Left Lower Limb   There is edema  FINDINGS:      Left   Impression       CFV   Normal (Patent)             CONCLUSION:   Impression:   RIGHT LOWER LIMB LIMITED: NORMAL   Evaluation shows no evidence of thrombus in the common femoral vein  Doppler evaluation shows a normal response to augmentation maneuvers  LEFT LOWER LIMB: NORMAL   No evidence of acute or chronic deep vein thrombosis   No evidence of superficial thrombophlebitis noted  Doppler evaluation shows a normal response to augmentation maneuvers  Popliteal, posterior tibial and anterior tibial arterial Doppler waveforms are   triphasic        SIGNATURE:   Electronically Signed by: Chandrika Calderon MD, RPVI on 2017-05-18 06:04:31 PM

## 2018-01-13 NOTE — MISCELLANEOUS
Message  Return to work or school:   Rob Mahoney is under my professional care  She was seen in my office on 6/10/16   She is able to return to work on  6/15/16      Please excuse her absence on 6/14/16  STEFANY Villalta        Signatures   Electronically signed by : Clarence Salazar DO; Jun 14 2016  2:00PM EST                       (Author)

## 2018-01-14 VITALS
DIASTOLIC BLOOD PRESSURE: 78 MMHG | SYSTOLIC BLOOD PRESSURE: 118 MMHG | HEART RATE: 94 BPM | RESPIRATION RATE: 18 BRPM | BODY MASS INDEX: 33.87 KG/M2 | WEIGHT: 177.5 LBS | TEMPERATURE: 99.4 F

## 2018-01-14 VITALS
WEIGHT: 182.5 LBS | HEART RATE: 76 BPM | DIASTOLIC BLOOD PRESSURE: 82 MMHG | HEIGHT: 61 IN | BODY MASS INDEX: 34.46 KG/M2 | SYSTOLIC BLOOD PRESSURE: 122 MMHG

## 2018-01-14 VITALS
RESPIRATION RATE: 12 BRPM | TEMPERATURE: 96 F | HEIGHT: 61 IN | DIASTOLIC BLOOD PRESSURE: 74 MMHG | SYSTOLIC BLOOD PRESSURE: 100 MMHG | HEART RATE: 72 BPM | WEIGHT: 182.38 LBS | BODY MASS INDEX: 34.43 KG/M2

## 2018-01-14 VITALS
WEIGHT: 182 LBS | DIASTOLIC BLOOD PRESSURE: 72 MMHG | BODY MASS INDEX: 34.73 KG/M2 | RESPIRATION RATE: 18 BRPM | HEART RATE: 72 BPM | SYSTOLIC BLOOD PRESSURE: 118 MMHG

## 2018-01-14 VITALS
WEIGHT: 177 LBS | HEART RATE: 84 BPM | BODY MASS INDEX: 33.77 KG/M2 | SYSTOLIC BLOOD PRESSURE: 118 MMHG | RESPIRATION RATE: 18 BRPM | DIASTOLIC BLOOD PRESSURE: 76 MMHG

## 2018-01-14 VITALS
RESPIRATION RATE: 18 BRPM | BODY MASS INDEX: 34.96 KG/M2 | WEIGHT: 179 LBS | HEART RATE: 64 BPM | DIASTOLIC BLOOD PRESSURE: 76 MMHG | SYSTOLIC BLOOD PRESSURE: 118 MMHG | TEMPERATURE: 97.2 F

## 2018-01-14 NOTE — PROGRESS NOTES
Assessment    1  Systemic lupus erythematosus (710 0) (M32 9)   2  Myofascial pain syndrome (729 1) (M79 1)   3  Chronic migraine (346 70) (G43 709)   4  Cervical radiculopathy (723 4) (M54 12)   5  Encounter for long term current azathioprine therapy (V58 69) (Z79 899)    Plan    1  (1) C3 COMPLEMENT; Status:Active; Requested for:11Oct2016;    2  (1) C4 COMPLEMENT; Status:Active; Requested for:11Oct2016;    3  (1) CBC/PLT/DIFF; Status:Active; Requested for:11Oct2016;    4  (1) COMPREHENSIVE METABOLIC PANEL; Status:Active; Requested for:11Oct2016;    5  (1) C-REACTIVE PROTEIN; Status:Active; Requested for:11Oct2016;    6  (1) DNA (DS) ANTIBODY; Status:Active; Requested for:11Oct2016;    7  (1) SED RATE; Status:Active; Requested for:11Oct2016;    8  Follow-up visit in 2 months Evaluation and Treatment  Follow-up  Status: Complete    Done: 87UHF1406    3  Call [phone] if: The symptoms seem worse ; Status:Complete;   Done:   17NQK2772   75  Call [phone] if: You have questions or concerns about your problem ;    Status:Complete;   Done: 30WNP9144   02  Call [phone] if: Your ankle swelling is getting worse ; Status:Complete;   Done:    59TFW0452    78  Azathioprine 100 MG TABS; TAKE 1 TABLET DAILY   13  Mobic 15 MG Oral Tablet (Meloxicam); TAKE 1 TABLET DAILY WITH FOOD   14  Vitamin D 2000 UNIT Oral Capsule; take 1 capsule daily    Discussion/Summary    This is a 62-year-old female presenting today for follow-up for lupus as well as fibromyalgia  Patient states that she's been feeling horrible since last appointment  She has had increased lower extremity edema primarily left leg  She was seen by her primary care physician and started on hydrochlorothiazide  The patient states that she has noticed increased fatigue and widespread muscle pain  The patient states that she does have difficulty getting out of bed and does describe morning stiffness to last several hours   She has noticed swelling in her left elbow and both knees as well  She continues to utilize azathioprine 100 mg daily however has discontinued amitriptyline and she does not want to take any antidepressive medication  The patient states that she has never tried Neurontin or Lyrica  She denies difficulty with sleep however does describe nonrestorative sleep  She also states that she's been experiencing low-grade fevers  On physical examination, patient has nonpitting edema of both lower extremities  She does have tenderness of the MCP and PIPs of the left hand as well as the left wrist, left elbow, left shoulder  She also has tenderness of the cervical, thoracic, and lumbar spine  She does have tenderness of bilateral greater trochanteric bursae as well as bilateral knees and ankles  There is no synovitis at this time  Patient's labs reveal a CBC, CMP, and sedimentation rate all within normal range  Patient has a normal C3 and C4  Patient continues to have an elevated CRP at 3 4 however it is improved from 8 4  Patient's double-stranded DNA is 18  At this time patient's history and physical examination is most consistent with lupus as well as fibromyalgia  It appears that the majority patient's symptoms at this time are symptoms of fibromyalgia  Patient was given the options of starting medication including Neurontin or Lyrica at this time however she declined  She states that she would like to continue to follow-up with her primary care physician for further adjustment of her diuretic as she has had continued lower extremity edema  Patient will continue with azathioprine at its current dose  We will repeat a CBC, CMP, CRP, Es, C3, C4, and double-stranded DNA before her next appointment  Patient will follow-up in 2 months time and she will contact the office if she has any further questions or concerns in the interim  Patient was given a work note to return to work tomorrow     The patient was counseled regarding diagnostic results, instructions for management, prognosis, patient and family education, risks and benefits of treatment options, importance of compliance with treatment  Chief Complaint  F/U SLE/FMS   Patient is here today for follow up of chronic conditions described in HPI  History of Present Illness  Patient is in the office today for follow up for SLE/FMS  Feeling horrible since last appt  Had left leg edema and was seen by PCP  Was placed of HCTZ  +Increased fatigue  Pain all over, hard to get out of bed in the morning  Swelling in the left elbow and both knees  +Am stiffness x few hours  Still taking AZA with no difference in symptoms  Stopped Amitriptyline because did not want to take  Never tried neurontin or lyrica  No difficulty with sleep  +Non restorative sleep  With low grade fevers as well  RAPID3: /30      Review of Systems    Constitutional: fever, recent 2-3 lb weight gain, fatigue and anorexia, but no chills and no recent weight loss  HEENT: dryness of the eyes, eye pain, erythema eye(s), dryness mouth and mouth sores, but no blurred vision, no double vision, no amaurosis fugax, not feeling congested and no sore throat  Cardiovascular: dyspnea on exertion and swelling in the arms or legs    The patient presents with complaints of chest pain or pressure (pressure)  Respiratory: shortness of breath and pleurisy, but no unusual or persistent cough  Gastrointestinal: no nausea, no vomiting, no heartburn, no diarrhea, no constipation, no melena and no BRBPR    The patient presents with complaints of abdominal pain (Right groin pain)  Genitourinary: No foamy urine, but no dysuria and no hematuria  Musculoskeletal: as noted in HPI  Integumentary Raynaud's, alopecia and nail changes, but no rash and no photosensitivity  Endocrine polydipsia, but no polyuria  Hematologic/Lymphatic: no unusual bleeding and no tendency for easy bruising     Neurological: headache, tingling and weakness    The patient presents with complaints of occasional episodes of vertigo or dizziness, described as lightheadedness  Psychiatric: insomnia and non-restorative sleep  Active Problems    1  Abnormal mammogram of right breast (793 80) (R92 8)   2  Bilateral leg edema (782 3) (R60 0)   3  Carpal tunnel syndrome on left (354 0) (G56 02)   4  Cervical radiculopathy (723 4) (M54 12)   5  Cervicalgia (723 1) (M54 2)   6  Chest pain (786 50) (R07 9)   7  Chronic migraine (346 70) (G43 709)   8  Encounter for long term current azathioprine therapy (V58 69) (Z79 899)   9  Migraine headache (346 90) (G43 909)   10  Myofascial pain syndrome (729 1) (M79 1)   11  Numbness and tingling in left hand (782 0) (R20 2)   12  Partial tear of right rotator cuff (840 4) (M75 111)   13  Right shoulder pain (719 41) (M25 511)   14  Rotator cuff syndrome, right (726 10) (M75 101)   15  Systemic lupus erythematosus (710 0) (M32 9)    Past Medical History    1  History of Arthralgia of multiple joints (719 49) (M25 50)   2  History of systemic lupus erythematosus (SLE) (V12 29) (Z87 39)   3  History of Lipid screening (V77 91) (Z13 220)   4  Migraine headache (346 90) (G43 909)   5  History of Visit for screening mammogram (V76 12) (Z12 31)    The active problems and past medical history were reviewed and updated today  Surgical History    1  History of Hysterectomy    The surgical history was reviewed and updated today  Family History  Mother    1  Family history of essential hypertension (V17 49) (Z82 49)  Sister    2  Family history of rheumatoid arthritis (V17 7) (Z82 61)  Maternal Relatives    3  Family history of systemic lupus erythematosus (V19 4) (Z82 69)  Family History    4  Family history of chronic obstructive pulmonary disease (V17 6) (Z82 5)   5  Denied: Family history of Crohn's disease   6  Denied: Family history of psoriasis   7  Denied: Family history of ulcerative colitis    The family history was reviewed and updated today  Social History    · Employed   · Never a smoker   · No alcohol use   · No drug use   · Non-smoker (V49 89) (Z78 9)   · Denied: History of Social alcohol use  The social history was reviewed and updated today  The social history was reviewed and is unchanged  Current Meds   1  Azasan 100 MG Oral Tablet; TAKE 1 TABLET DAILY; Therapy: 07KHL3248 to Recorded   2  Azathioprine 100 MG TABS; TAKE 1 TABLET DAILY; Therapy: (Recorded:2016) to Recorded   3  Biotin TABS; Take 1 tablet daily; Therapy: (Recorded:05Sft9407) to Recorded   4  Butalbital-APAP-Caffeine -40 MG Oral Tablet; TAKE 1 TABLET TWICE DAILY AS   NEEDED  Requested for: 23Frj1728; Last Rx:34Urm2176 Ordered   5  Flonase 50 MCG/ACT SUSP; 2 PUFFS EACH NOSTRIL DAILY; Therapy: (Recorded:00Fta2592) to Recorded   6  Hydrochlorothiazide 12 5 MG Oral Tablet; TAKE 1 TABLET DAILY IN THE MORNING; Therapy: 93DBR4543 to (Evaluate:80Xlx8483)  Requested for: 31JRJ0927; Last   Rx:85Gig4158 Ordered   7  Mobic 15 MG Oral Tablet; TAKE 1 TABLET DAILY WITH FOOD; Therapy: (Recorded:2016) to Recorded   8  Vitamin D 2000 UNIT Oral Capsule; take 1 capsule daily; Therapy: (Recorded:2016) to Recorded    The medication list was reviewed and updated today  Immunizations  Influenza --- Juani Lashay: 2016   Tdap --- Juani Lashay: 2016     Allergies    1  No Known Drug Allergies    Vitals  Signs   Recorded: 05YOY8820 15:35LW   Systolic: 437  Diastolic: 84  Heart Rate: 64  Weight: 184 lb   BMI Calculated: 35 24  BSA Calculated: 1 81    Physical Exam    Constitutional   General appearance: Abnormal   overweight  Eyes   Conjunctiva and lids: No swelling, erythema or discharge  Pupils and irises: Equal, round and reactive to light  Ears, Nose, Mouth, and Throat   External inspection of ears and nose: Normal     Oropharynx: Abnormal   Oral mucosa was dry     Pulmonary   Respiratory effort: No increased work of breathing or signs of respiratory distress  Auscultation of lungs: Clear to auscultation  Cardiovascular   Auscultation of heart: Normal rate and rhythm, normal S1 and S2, without murmurs  Examination of extremities for edema and/or varicosities: Abnormal   nonpitting edema present  Lymphatic   Palpation of lymph nodes in neck: No lymphadenopathy  Psychiatric   Orientation to person, place, and time: Normal     Mood and affect: Normal         Left wrist tenderness  Left Elbow tenderness and swelling  Left glenohumeral joint tenderness  Right ankle tenderness  Right hip tenderness  Left ankle tenderness  Left hip tenderness  Musculoskeletal - Joints, bones, and muscles: Abnormal  Palpation - mid-thoracic, lower mid-lumbar, right lower lumbar, left lower lumbar, C5, C6 and C7 tenderness  The patient has tenderness in all MCP and PIP joints of the left hand  Right foot: All MTP, PIP and DIP joints are normal  Left foot: All MTP, PIP and DIP joints are normal       Results/Data  (1) C3 COMPLEMENT 54Zgi1052 09:57AM [facility] Order Number: [id number]     Test Name Result Flag Reference   C3 COMPLEMENT 122 mg/dL  82 - 167   Performed at:  [address] [place] [address]  [id number]  : Modesto Ramirez MD, Phone:  [phone]     (1) C4 COMPLEMENT 58CQP4957 09:57AM [facility] Order Number: [id number]     Test Name Result Flag Reference   C4 COMPLEMENT 27 mg/dL  14 - 44   Performed at:  [address] [place] [address]  [id number]  : Modesto Ramirez MD, Phone:  [phone]     (1) CBC/PLT/DIFF 88XZX9977 09:57AM [facility] Order Number: [id number]     Test Name Result Flag Reference   WBC COUNT 3 51 Thousand/uL L 4 31-10 16   RBC COUNT 4 83 Million/uL  3 81-5 12   HEMOGLOBIN 13 9 g/dL  11 5-15 4   HEMATOCRIT 43 8 %  34 8-46  1   MCV 91 fL  82-98   MCH 28 8 pg  26 8-34 3   MCHC 31 7 g/dL  31 4-37 4   RDW 15 2 % H 11 6-15 1   MPV 11 5 fL  8 9-12 7   PLATELET COUNT 382 Thousands/uL  149-390   nRBC AUTOMATED 0 /100 WBCs     NEUTROPHILS RELATIVE PERCENT 50 %  43-75   LYMPHOCYTES RELATIVE PERCENT 37 %  14-44   MONOCYTES RELATIVE PERCENT 11 %  4-12   EOSINOPHILS RELATIVE PERCENT 1 %  0-6   BASOPHILS RELATIVE PERCENT 1 %  0-1   NEUTROPHILS ABSOLUTE COUNT 1 77 Thousands/?L L 1 85-7 62   LYMPHOCYTES ABSOLUTE COUNT 1 28 Thousands/?L  0 60-4 47   MONOCYTES ABSOLUTE COUNT 0 38 Thousand/?L  0 17-1 22   EOSINOPHILS ABSOLUTE COUNT 0 05 Thousand/?L  0 00-0 61   BASOPHILS ABSOLUTE COUNT 0 02 Thousands/?L  0 00-0 10   - Patient Instructions: This bloodwork is non-fasting  Please drink two glasses of water morning of bloodwork  - Patient Instructions: This bloodwork is non-fasting  Please drink two glasses of water morning of bloodwork  (1) COMPREHENSIVE METABOLIC PANEL 30MPX1013 22:07ME Demetri Nick    Order Number: [id number]     Test Name Result Flag Reference   GLUCOSE,RANDM 79 mg/dL     If the patient is fasting, the ADA then defines impaired fasting glucose as > 100 mg/dL and diabetes as > or equal to 123 mg/dL  SODIUM 138 mmol/L  136-145   POTASSIUM 3 8 mmol/L  3 5-5 3   CHLORIDE 102 mmol/L  100-108   CARBON DIOXIDE 27 mmol/L  21-32   ANION GAP (CALC) 9 mmol/L  4-13   BLOOD UREA NITROGEN 13 mg/dL  5-25   CREATININE 0 89 mg/dL  0 60-1 30   Standardized to IDMS reference method   CALCIUM 9 3 mg/dL  8 3-10 1   BILI, TOTAL 0 33 mg/dL  0 20-1 00   ALK PHOSPHATAS 103 U/L     ALT (SGPT) 76 U/L  12-78   AST(SGOT) 43 U/L  5-45   ALBUMIN 4 0 g/dL  3 5-5 0   TOTAL PROTEIN 7 7 g/dL  6 4-8 2   eGFR Non-African American      >60 0 ml/min/1 73sq m   [facility] Disease Education Program recommendations are as follows:  GFR calculation is accurate only with a steady state creatinine  Chronic Kidney disease less than 60 ml/min/1 73 sq  meters  Kidney failure less than 15 ml/min/1 73 sq  meters       (1) C-REACTIVE PROTEIN 30Pzk3324 09:57AM [facility] Ho   TW Order Number: [id number]     Test Name Result Flag Reference   C-REACT PROTEIN 3 4 mg/L H <3 0     (1) DNA (DS) ANTIBODY 83Fbk4622 09:57AM [facility] Post   TW Order Number: [id number]     Test Name Result Flag Reference   DNA (DS) ANTIB 18 IU/mL H 0 - 9   Negative      <5                                     Equivocal  5 - 9                                     Positive      >9    Performed at:  [facility] [place] [address]  [id number]  : Americo Hernandez MD, Phone:  [phone]     (1) SED RATE 31YUA8701 09:57AM [facility] Ho   TW Order Number: [id number]     Test Name Result Flag Reference   SED RATE 18 mm/hour  0-20       Attending Note  Collaborating Physician: I did not interview and examine the patient, I discussed the case with the Advanced Practitioner and reviewed the note and I agree with the Advanced Practitioner note        Future Appointments    Date/Time Provider Specialty Site   11/14/2016 03:00 PM Ruben Sarkar MD [facility]   12/13/2016 01:00 PM Uzair Ho DO Rheumatology ST [address] [facility]     Signatures   Electronically signed by : WALLACE Toro; Oct 11 2016 10:14AM EST                       (Author)    Electronically signed by : Anneliese Romo DO; Oct 11 2016 10:23AM EST                       (Author)

## 2018-01-15 VITALS
SYSTOLIC BLOOD PRESSURE: 104 MMHG | HEART RATE: 84 BPM | RESPIRATION RATE: 16 BRPM | DIASTOLIC BLOOD PRESSURE: 72 MMHG | WEIGHT: 182.38 LBS | BODY MASS INDEX: 34.8 KG/M2

## 2018-01-15 NOTE — PROGRESS NOTES
Assessment    1  Encounter for preventive health examination (V70 0) (Z00 00)   2  Chest pain (786 50) (R07 9)    Plan  Chest pain    · STRESS TEST ONLY, EXERCISE; Status:Hold For - Scheduling; Requested  for:28Qlk3842; Health Maintenance    · Follow-up visit in 1 year Evaluation and Treatment  Follow-up  Status: Hold For -  Scheduling  Requested for: 64GNX1551   · Begin a limited exercise program ; Status:Complete;   Done: 56ISJ2915 06:37PM   · Brush your teeth freq1 and floss at least once a day ; Status:Complete;   Done:  22OQW8677 06:37PM   · Eat a low fat and low cholesterol diet ; Status:Complete;   Done: 34UGQ9963 06:37PM   · Eat foods that are high in calcium ; Status:Complete;   Done: 86DKV5559 06:37PM   · Some eating tips that can help you lose weight ; Status:Complete;   Done: 67LBM5700  06:37PM   · Use a sun block product with an SPF of 15 or more ; Status:Complete;   Done:  49IUS8573 06:37PM   · Vitamins can help you get daily requirements that your diet may not be giving you ;  Status:Complete;   Done: 16XLR4371 06:37PM   · We recommend routine visits to a dentist ; Status:Complete;   Done: 57RSC9673  06:37PM   · We recommend that you bring your body mass index down to 26 ; Status:Complete;    Done: 95RRK6509 06:37PM   · Call [phone] if: You find a new or different kind of lump in your breast ;  Status:Complete;   Done: 92ARA4537 06:37PM   · Call [phone] if: You have any bleeding from the vagina ; Status:Complete;    Done: 32LZC8335 06:37PM   · Call [phone] if: You have any warning signs of skin cancer ; Status:Complete;    Done: 62WJZ8701 06:37PM    Discussion/Summary  health maintenance visit Currently, she eats a healthy diet and has an adequate exercise regimen  Chief Complaint  Pt is here for wellness exam      History of Present Illness  HM, Adult Female: The patient is being seen for a health maintenance evaluation  The last health maintenance visit was year(s) ago  General Health: The patient's health since the last visit is described as good  She does not have regular dental visits  She denies vision problems  She denies hearing loss  Immunizations status: up to date  Lifestyle:  She consumes a diverse and healthy diet  She does not have any weight concerns  She exercises regularly  She does not use tobacco  She denies alcohol use  She denies drug use  Reproductive health:  she reports normal menses  she uses no contraception  she is sexually active  pregnancy history: G 2P 2, 2  Screening: cancer screening reviewed and updated  metabolic screening reviewed and updated  risk screening reviewed and updated  HPI: never started on medications  chest pain in the middle of the chest on and off for the last 2 weeks  pressure like pain - woke up with it in the am  nothing makes it better or worse  had a complete work up 2 years ago which was normal   seeing Shyanne Escudero next week      Review of Systems    Constitutional: no fever and no chills  Eyes: No complaints of eye pain, no red eyes, no eyesight problems, no discharge, no dry eyes, no itching of eyes  ENT: no complaints of earache, no loss of hearing, no nose bleeds, no nasal discharge, no sore throat, no hoarseness  Cardiovascular: as noted in HPI  Respiratory: No complaints of shortness of breath, no wheezing, no cough, no SOB on exertion, no orthopnea, no PND  Gastrointestinal: No complaints of abdominal pain, no constipation, no nausea or vomiting, no diarrhea, no bloody stools  Genitourinary: No complaints of dysuria, no incontinence, no pelvic pain, no dysmenorrhea, no vaginal discharge or bleeding  Musculoskeletal: arthralgias and myalgias  Integumentary: No complaints of skin rash or lesions, no itching, no skin wounds, no breast pain or lump     Neurological: No complaints of headache, no confusion, no convulsions, no numbness, no dizziness or fainting, no tingling, no limb weakness, no difficulty walking  Psychiatric: Not suicidal, no sleep disturbance, no anxiety or depression, no change in personality, no emotional problems  Endocrine: No complaints of proptosis, no hot flashes, no muscle weakness, no deepening of the voice, no feelings of weakness  Hematologic/Lymphatic: No complaints of swollen glands, no swollen glands in the neck, does not bleed easily, does not bruise easily  Active Problems    1  Abnormal mammogram of right breast (793 80) (R92 8)   2  Arthralgia of multiple joints (719 49) (M25 50)   3  Carpal tunnel syndrome on left (354 0) (G56 02)   4  Cervical radiculopathy (723 4) (M54 12)   5  Lipid screening (V77 91) (Z13 220)   6  Lupus (710 0) (M32 9)   7  Migraine headache (346 90) (G43 909)   8  Numbness and tingling in left hand (782 0) (R20 2)   9  Right shoulder pain (719 41) (M25 511)   10  Visit for screening mammogram (V76 12) (Z12 31)    Surgical History    · History of Hysterectomy    Family History  Mother    · Family history of essential hypertension (V17 49) (Z82 49)  Family History    · Family history of chronic obstructive pulmonary disease (V17 6) (Z82 5)    Social History    · Never a smoker   · Non-smoker (V49 89) (Z78 9)   · Denied: History of Social alcohol use    Current Meds   1  DULoxetine HCl - 60 MG Oral Capsule Delayed Release Particles; take 1 capsule daily; Therapy: 15Apr2016 to (Evaluate:41Sld8928)  Requested for: 15Apr2016; Last   Rx:15Apr2016 Ordered   2  Fioricet TABS; Therapy: (Recorded:19Mar2016) to Recorded   3  Meloxicam 15 MG Oral Tablet; TAKE 1 TABLET DAILY AS NEEDED; Therapy: 15Apr2016 to (Evaluate:21Xob3713)  Requested for: 15Apr2016; Last   Rx:15Apr2016 Ordered   4  Vitamin C 500 MG Oral Tablet Chewable; Therapy: (Recorded:19Mar2016) to Recorded   5  Vitamin D 1000 UNIT CAPS; Therapy: (Recorded:19Mar2016) to Recorded    Allergies    1   No Known Drug Allergies    Vitals   Recorded: 35AFS6463 06:17PM   Heart Rate 68   Systolic 632   Diastolic 74   Height 5 ft 0 63 in   Weight 178 lb 8 oz   BMI Calculated 34 14   BSA Calculated 1 79     Physical Exam    Constitutional   General appearance: No acute distress, well appearing and well nourished  Head and Face   Head and face: Normal     Palpation of the face and sinuses: No sinus tenderness  Eyes   Conjunctiva and lids: No swelling, erythema or discharge  Pupils and irises: Equal, round, reactive to light  Ophthalmoscopic examination: Normal fundi and optic discs  Ears, Nose, Mouth, and Throat   External inspection of ears and nose: Normal     Otoscopic examination: Tympanic membranes translucent with normal light reflex  Canals patent without erythema  Hearing: Normal     Nasal mucosa, septum, and turbinates: Normal without edema or erythema  Lips, teeth, and gums: Normal, good dentition  Oropharynx: Normal with no erythema, edema, exudate or lesions  Neck   Neck: Supple, symmetric, trachea midline, no masses  Thyroid: Normal, no thyromegaly  Pulmonary   Respiratory effort: No increased work of breathing or signs of respiratory distress  Percussion of chest: Normal     Auscultation of lungs: Clear to auscultation  Cardiovascular   Palpation of heart: Normal PMI, no thrills  Auscultation of heart: Normal rate and rhythm, normal S1 and S2, no murmurs  Examination of extremities for edema and/or varicosities: Normal     Chest   Chest: Normal     Abdomen   Abdomen: Non-tender, no masses  Liver and spleen: No hepatomegaly or splenomegaly  Examination for hernias: No hernia appreciated  Lymphatic   Palpation of lymph nodes in neck: No lymphadenopathy  Palpation of lymph nodes in axillae: No lymphadenopathy  Palpation of lymph nodes in groin: No lymphadenopathy  Musculoskeletal   Gait and station: Normal     Digits and nails: Normal without clubbing or cyanosis      Joints, bones, and muscles: Normal     Range of motion: Normal     Stability: Normal     Muscle strength/tone: Normal     Skin   Skin and subcutaneous tissue: Normal without rashes or lesions  Palpation of skin and subcutaneous tissue: Normal turgor  Neurologic   Cranial nerves: Cranial nerves II-XII intact  Cortical function: Normal mental status  Reflexes: 2+ and symmetric  Sensation: No sensory loss  Coordination: Normal finger to nose and heel to shin  Psychiatric   Judgment and insight: Normal     Orientation to person, place, and time: Normal     Recent and remote memory: Intact      Mood and affect: Normal        Signatures   Electronically signed by : Harlan Jordan MD; May 12 2016  6:38PM EST                       (Author)
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
## 2018-01-16 NOTE — MISCELLANEOUS
Message  Return to work or school:   Katia Hughes is under my professional care  She was seen in my office on Monday May 9th at 4 pm for one hour testing  She is able to return to work on   Tues 5/10            Signatures   Electronically signed by : Abbey Cosme DO; May 12 2016 11:08AM EST                       (Author)

## 2018-01-16 NOTE — MISCELLANEOUS
Message  Return to work or school:   Luci Hdez is under my professional care  She was seen in my office on 7/14/16   She is able to return to work on  7/15/16       STEFANY Robert        Signatures   Electronically signed by : César Salvador DO; Jul 14 2016  3:46PM EST                       (Author)

## 2018-01-17 NOTE — RESULT NOTES
Verified Results  *US BREAST RIGHT LIMITED (DIAGNOSTIC) [id number] 03:11PM Otf Larson Order Number: [id number]    - Patient Instructions: To schedule this appointment, please contact Central Scheduling at [phone]  Test Name Result Flag Reference   US BREAST RIGHT LIMITED (Report)     Patient History:   Family history of breast cancer in paternal aunt at age 48 or    over  Benign stereotactic core biopsy of the left breast, 2014  Patient has never smoked  Patient's BMI is 27 1  Reason for exam: follow-up at short interval from prior study  US Breast Right Limited: October 7, 2016 - Check In #: [de-identified]   Technologist: Erlinda Rutherford RDMS   Prior study comparison: May 5, 2016, mammo diagnostic right W CAD   performed at [address]  May 5, 2016, US   breast right limited performed at [address]  April 28, 2016, mammo screening bilateral W DBT and CAD,   performed at [place] [address]  2014,    mammo diagnostic bilateral W DBT  2014, mammo screening    bilateral  2013, mammo screening bilateral  2011, mammo    screening bilateral       Ultrasound images the right breast were obtained in    area of previously noted abnormalities  1-2 o'clock 4 cm from    the nipple, a stable 4 x 6 x 3 mm minimally complex cyst is seen    unchanged from previous exam  3:00 4 cm from the nipple    previously identified complex cyst now markedly smaller in size    in simple in appearance measuring 3 x 2 x 4 mm is noted  Six-month ultrasound follow-up of the 1-2 o'clock lesion is    recommended  ASSESSMENT: BiRad:3 - Probably Benign     Recommendation:   Ultrasound of the right breast in 6 months  Transcription Location:  Mari [facility]: [id number]     Risk Value(s):   Tyrer-Cuzick 10 Year: 2 852%, Tyrer-Cuzick Lifetime: 11 486%,    Myriad Table: 1 5%, DELLA 5 Year: 1 0%, NCI Lifetime: 8 8%   Signed by:    Akbar Gann MD   10/7/16       Plan  Abnormal mammogram of right breast    · *US BREAST RIGHT LIMITED (DIAGNOSTIC);  Status:Hold For - Scheduling; Requested  for:07Apr2017;     Discussion/Summary   normal Ultrasound of right breast  repeat u/s breast in 6 months    - Dr Pamela Grijalva   Electronically signed by : Teri Headley MD; Oct  7 2016  4:13PM EST                       (Author)

## 2018-01-17 NOTE — CONSULTS
Assessment    1  Systemic lupus erythematosus (710 0) (M32 9)   2  Myofascial pain syndrome (729 1) (M79 1)   3  Migraine headache (346 90) (G43 909)   4  Chest pain (786 50) (R07 9)    Plan  Systemic lupus erythematosus    · (1) CBC/PLT/DIFF; Status:Active; Requested [id number];    · (1) CHRONIC HEPATITIS PANEL; Status:Active; Requested SHP:[id number];    · (1) HIV AG/AB COMBO, 4TH GEN; [Do Not Release]; Status:Active; Requested  RPI:[id number];    · (1) URINALYSIS (will reflex a microscopy if leukocytes, occult blood, protein or nitrites are  not within normal limits); Status:Active; Requested LEZ:[id number];    · (1) URINE PROTEIN CREATININE RATIO; Status:Active; Requested [id number]:[id number];    · Follow-up visit in 6 weeks Evaluation and Treatment  Follow-up  Status: Hold For -  Scheduling  Requested for: 11UZV7421   · Call [phone] if: The symptoms seem worse ; Status:Complete;   Done:  89TAV0655    Discussion/Summary    Ms Jennifer Tucker is a 78-year-old  female who presents the office with a history of abnormal laboratory studies that were drawn in April of this year  She saw her PCP for ongoing arthralgias, and was found have a mildly positive double-stranded DNA  She does carry a previous diagnosis of SLE from approximately 4 years ago  She is seen multiple rheumatologists, and most recently was told by Dr Elly Ellis 2 years ago, but she did not have lupus and she subsequently stopped all of her medications  Since that time, she has been hospitalized on 2 separate occasions for chest pain and numbness into her left arm, which was felt to be due to her lupus  She also reports that her primary care physician feels that she may have some underlying fibromyalgia as well  She complains of swelling and tingling daily on the entire left side of her body  She complains of swelling and pain in her left leg and ankle  She does report that her symptoms seem to be worse with humid weather   She has noted swelling in her hands as well  She has previously tried Plaquenil, prednisone, methotrexate and meloxicam for her symptoms  She did not find any of these medications to be effective except prednisone  She did have significant weight gain because of the prednisone  She most recently tried meloxicam as needed for her pain, but when she took it on a consistent basis it did worsen her swelling  She also has a history of a right rotator cuff tear as well as neck pain  She is also had 4 migraines since January of this year, which is unusual for her  She reports occasional morning stiffness which typically lasts several hours before improvement  She also reports occasional difficulty sleeping because of pain as well as occasional nonrestorative sleep  She denies any fatigue  She does report having good and bad days as far as her pain, and she will occasionally have subjective fevers when her symptoms are worse  On exam, there is synovitis of the PIPs of the hands, left more so than right, as well as the bilateral ankles  There is no other active synovitis  She does have multiple myofascial tender points throughout the spine  Review of laboratory studies revealed a negative MARCELINO, a mildly positive double-stranded DNA 14, and elevated ESR and CRP  The remainder of the connective tissue disease serologies were negative  TSH and vitamin D were within normal limits, as was a CMP  At this time, her history, exam, and laboratory studies do appear most consistent with SLE which is active and uncontrolled off of any DMARD therapy  We did discuss several treatment options, and we have opted to start azathioprine 50 mg daily  I would like to check a CBC, hepatitis panel, HIV, urinalysis, and urine protein creatinine ratio prior to starting the azathioprine  If her CBC is within normal limits, and her hepatitis panel and HIV are negative, we will start the azathioprine   If this therapy is initiated, we will plan to recheck a CBC, CMP, ESR, CRP, double-stranded DNA, C3, and C4 before her follow-up  I will reevaluate her in 6 weeks  She will call in the interim if there are any questions or concerns  Counseling  Rheumatology Counseling Documentation: The patient was counseled regarding diagnostic results, instructions for management, impressions and risks and benefits of treatment options  The following educational handouts were provided: Azathioprine  Chief Complaint  History of SLE      History of Present Illness  Pt  is a 46 yo  female who presents with history of abnormal labs in 4/2016  Saw PCP and found to have + dsDNA  Has not seen Rheumatology in 2 years  Previously diagnosed with SLE about 4 years ago  Previously saw Dr Belinda Musa  Has been hospital x 2 for CP and numbness in L arm after SLE diagnosis  PCP feels she has FMS as well  c/o swelling and tingling on entire L side  c/o pain and swelling in L leg and ankle  Symptoms worse with humid weather  + swelling in hands as well  Previously treated with HCQ, Prednisone, MTX, and Meloxicam  All meds stopped when she was told by Dr Belinda Musa told her she did not have SLE  Saw Dr Sharee Carpio in [place]  who confirmed mild SLE  Tried Meloxicam for pain recently, but caused significant swelling  Has history of R rotator cuff tear and neck pain as well  Has had 4 migraines since 1/2016, which is unusual for her  + occasional AM stiffness x several hours  + occasional difficulty sleeping due to pain  + occasional non-restorative sleep  No fatigue  Has good and bad days  c/o subjective fevers at times as well  RAPID3: 8 8/30      Review of Systems    Constitutional: fever and chills, but no recent weight gain, fatigue and no anorexia    The patient presents with complaints of recent 15 lb weight loss (intentional since 1/2016)     HEENT: blurred vision, dryness of the eyes, eye pain, erythema eye(s) and mouth sores, but no double vision, no amaurosis fugax, no dryness mouth, not feeling congested and no sore throat  Cardiovascular: swelling in the arms or legs, but no dyspnea on exertion    The patient presents with complaints of anterior mid-chest chest pain or pressure, described as pleuritic  Respiratory: shortness of breath and pleurisy, but no unusual or persistent cough  Gastrointestinal: no abdominal pain, no vomiting, no heartburn, no diarrhea, no constipation, no melena and no BRBPR    The patient presents with complaints of occasional episodes of nausea  Genitourinary: no foamy urine, but no dysuria and no hematuria  Musculoskeletal: as noted in HPI  Integumentary rash and alopecia, but no Raynaud's, no nail changes and no photosensitivity  Endocrine polydipsia, but no polyuria  Hematologic/Lymphatic: a tendency for easy bruising and clotting skin or lumps, but no unusual bleeding  Neurological: headache and tingling, but no weakness    The patient presents with complaints of occasional episodes of vertigo or dizziness, described as faintness  Psychiatric: insomnia and non-restorative sleep, but no depression and no anxiety  Active Problems    1  Arthralgia of multiple joints (719 49) (M25 50)   2  Carpal tunnel syndrome on left (354 0) (G56 02)   3  Cervical radiculopathy (723 4) (M54 12)   4  Chest pain (786 50) (R07 9)   5  Lupus (710 0) (M32 9)   6  Migraine headache (346 90) (G43 909)   7  Numbness and tingling in left hand (782 0) (R20 2)   8  Partial tear of right rotator cuff (840 4) (M75 111)   9  Right shoulder pain (719 41) (M25 511)    Past Medical History    1  History of Abnormal mammogram of right breast (793 80) (R92 8)   2  History of Lipid screening (V77 91) (Z13 220)   3  Migraine headache (346 90) (G43 909)   4  History of Visit for screening mammogram (V76 12) (Z12 31)    Surgical History    1  History of Hysterectomy    OB History  Pregnancy History (Brief):   Prior pregnancies: : 2   Para: 0 (abortions) and 2 (living) Additional pregnancy history details: 0 miscarriage(s)       Family History  Mother    1  Family history of essential hypertension (V17 49) (Z82 49)  Sister    2  Family history of rheumatoid arthritis (V17 7) (Z82 61)  Maternal Relatives    3  Family history of systemic lupus erythematosus (V19 4) (Z82 69)  Family History    4  Family history of chronic obstructive pulmonary disease (V17 6) (Z82 5)   5  Denied: Family history of Crohn's disease   6  Denied: Family history of psoriasis   7  Denied: Family history of ulcerative colitis    Social History    · Employed   · Never a smoker   · No alcohol use   · No drug use   · Non-smoker (V49 89) (Z78 9)   · Denied: History of Social alcohol use    Current Meds   1  Fioricet TABS; TAKE 1 OR 2 TABLETS EVERY 4 HOURS AS NEEDED FOR HEADACHE  DO NOT EXCEED 6 TABLETS IN 24 HOURS; Therapy: (Recorded:10Jun2016) to Recorded   2  Meloxicam 15 MG Oral Tablet; TAKE 1 TABLET DAILY AS NEEDED; Therapy: 15Apr2016 to (Evaluate:50Urd8415)  Requested for: 15Apr2016; Last   Rx:15Apr2016 Ordered   3  Vitamin D 1000 UNIT CAPS; take 1 capsule daily; Therapy: (Recorded:10Jun2016) to Recorded    Immunizations   1    Influenza  14TDA8616    Tdap  82JVV5222     Allergies    1  No Known Drug Allergies    Vitals  Signs [Data Includes: Current Encounter]   Recorded: 17KFG1938 01:42PM   Heart Rate: 68  Systolic: 864  Diastolic: 70  Weight: 248 lb     Physical Exam    Constitutional   General appearance: Abnormal   obese  Eyes   Conjunctiva and lids: No swelling, erythema or discharge  Pupils and irises: Equal, round and reactive to light  Ears, Nose, Mouth, and Throat   External inspection of ears and nose: Normal     Oropharynx: Abnormal   Oropharynx examination showed a(n) ulcer on the right side of the hard palate  Pulmonary   Respiratory effort: No increased work of breathing or signs of respiratory distress  Auscultation of lungs: Clear to auscultation      Cardiovascular Auscultation of heart: Normal rate and rhythm, normal S1 and S2, without murmurs  Examination of extremities for edema and/or varicosities: Normal     Lymphatic   Palpation of lymph nodes in neck: No lymphadenopathy  Psychiatric   Orientation to person, place, and time: Normal     Mood and affect: Normal         Left glenohumeral joint tenderness  Right ankle tenderness and swelling  Left ankle tenderness and swelling  Musculoskeletal - Joints, bones, and muscles: Abnormal  Palpation - bilateral knee crepitus  Muscle strength/tone: Normal  Motor Strength Findings: left hand dominance, but normal upper extremity strength and normal lower extremity strength  Right upper extremity: shoulder flexion 5/5, shoulder extension 5/5, biceps 5/5, triceps 5/5, but normal hand   Left upper extremity shoulder flexion 5/5, shoulder extension 5/5, biceps 5/5, triceps 5/5, but normal hand   Right lower extremity strength: hip flexion 5/5, hip abduction 5/5, hip adduction 5/5, knee flexion 5/5, knee extension 5/5, ankle dorsiflexion 5/5, ankle plantar flexion 5/5  Left lower extremity strength: hip flexion 5/5, hip abduction 5/5, hip adduction 5/5, knee flexion 5/5, knee extension 5/5, ankle dorsiflexion 5/5, ankle plantar flexion 5/5  Skin - Skin and subcutaneous tissue: Normal    Neurologic - Reflexes: Normal  Deep tendon reflexes: 2+ right biceps, 2+ left biceps, 2+ right triceps, 2+ left triceps, 2+ right brachioradialis, 2+ left brachioradialis, 2+ right patella, 2+ left patella, 2+ right ankle jerk and 2+ left ankle jerk  Sensation: Normal    Additional Findings - + multiple myofascial tender points  The patient has tenderness in all MCP and PIP joints of the right hand  The patient has tenderness in all MCP and PIP joints of the left hand  The patient has swelling of all PIP joints of the right hand  The patient has swelling of all PIP joints of the left hand  Results/Data  Results   (1) C-REACTIVE PROTEIN 09Apr2016 10:26AM Mello Tonoivania Moran Order Number: [id number]     Test Name Result Flag Reference   C-REACT PROTEIN 7 1 mg/L H <3 0     (1) LYME ANTIBODY PROFILE W/REFLEX TO WESTERN BLOT 09Apr2016 10:26AM Mello Tonoivania Moran Order Number: [id number]     Test Name Result Flag Reference   LYME IGG 0 04  0 00-0 79   NEGATIVE(0 00-0 79)-Absence of detectable Borrelia IgG Antibodies  A negative result does not exclude the possibility of Borrelia infection  If early Lyme disease is suspected,a second sample should be collected & tested 4 weeks after initial testing  LYME IGM 0 13  0 00-0 79   NEGATIVE (0 00-0 79)-Absence of detectable Borrelia IgM antibodies  A negative result does not exclude the possibility of Borrelia infection  If early lyme disease is suspected, a second sample should be collected & tested 4 weeks after initial testing      (1) SED RATE 09Apr2016 10:26AM Mello Tonoivania Moran Order Number: [id number]     Test Name Result Flag Reference   SED RATE 29 mm/hour H 0-20     (1) TSH WITH FT4 REFLEX 09Apr2016 10:26AM Mello Tonoivania Moran Order Number: [id number]    Patients undergoing fluorescein dye angiography may retain small amounts of fluorescein in the body for 48-72 hours post procedure  Samples containing fluorescein can produce falsely depressed TSH values  If the patient had this procedure,a specimen should be resubmitted post fluorescein clearance          The recommended reference ranges for TSH during pregnancy are as follows:  First trimester 0 1 to 2 5 uIU/mL  Second trimester  0 2 to 3 0 uIU/mL  Third trimester 0 3 to 3 0 uIU/m     Test Name Result Flag Reference   TSH 3 430 uIU/mL  0 358-3 740     (1) VITAMIN D 25-HYDROXY 09Apr2016 10:26AM Milton Sarkar Order Number: [id number]     Order Number: [id number]     Test Name Result Flag Reference   VIT D 25-HYDROX 32 6 ng/mL  30 0-100 0     (1) COMPREHENSIVE METABOLIC PANEL 25NHT5073 10: Alejandra De Jesus Order Number: [id number]      National Kidney Disease Education Program recommendations are as follows:  GFR calculation is accurate only with a steady state creatinine  Chronic Kidney disease less than 60 ml/min/1 73 sq  meters  Kidney failure less than 15 ml/min/1 73 sq  meters  Test Name Result Flag Reference   GLUCOSE,RANDM 86 mg/dL     If the patient is fasting, the ADA then defines impaired fasting glucose as > 100 mg/dL and diabetes as > or equal to 123 mg/dL  SODIUM 142 mmol/L  136-145   POTASSIUM 3 8 mmol/L  3 5-5 3   CHLORIDE 108 mmol/L  100-108   CARBON DIOXIDE 27 mmol/L  21-32   ANION GAP (CALC) 7 mmol/L  4-13   BLOOD UREA NITROGEN 13 mg/dL  5-25   CREATININE 0 86 mg/dL  0 60-1 30   Standardized to IDMS reference method   CALCIUM 8 8 mg/dL  8 3-10 1   BILI, TOTAL 0 46 mg/dL  0 20-1 00   ALK PHOSPHATAS 125 U/L H    ALT (SGPT) 52 U/L  12-78   AST(SGOT) 26 U/L  5-45   ALBUMIN 3 9 g/dL  3 5-5 0   TOTAL PROTEIN 7 5 g/dL  6 4-8 2   eGFR Non-African American      >60 0 XI/QCZ/6 28DY m     (1) CYCLIC CITRULLINATED PEPTIDE 09Apr2016 10:26AM Stevo Sarkar Order Number: [id number]    Performed at:  [address]  [id number]  : Veda Borden MD, Phone:  [phone]     Test Name Result Flag Reference   CYCLIC CITRULLINATED PEPTIDE 6 units  0 - 19   Negative               <20                          Weak positive      20 - 39                          Moderate positive  40 - 59                          Strong positive        >59     (1) MARCELINO SCREEN W/REFLEX TO TITER/PATTERN 09Apr2016 10:26AM Stevo Sarkar Order Number: [id number]     Test Name Result Flag Reference   MARCELINO SCREEN   Negative  Negative     (1) DNA (DS) ANTIBODY 09Apr2016 10:26AM Stevo Sarkar Order Number: [id number]    Performed at:  [address] [place] [address]  [id number]  : Bran Walsh MD, Phone: [phone]     Test Name Result Flag Reference   DNA (DS) ANTIB 14 IU/mL H 0 - 9   Negative      <5                                   Equivocal  5 - 9                                   Positive      >9     (1) SJOGREN'S ANTIBODIES 30Zhm5888 10:26AM Milton Sarkar Order Number: [id number]    Performed at:  [address]  [id number]  : Kasey Farias MD, Phone:  [phone]     Test Name Result Flag Reference   SS-A <0 2 AI  0 0 - 0 9   SS-B <0 2 AI  0 0 - 0 9     (1) HLA B27 77Jml7125 10:26AM Milton Sarkar Order Number: [id number]    Performed at:  [address]  [id number]  : Starr Morales PhD, Phone:  [phone]     Test Name Result Flag Reference   HLA B27 Negative     HLA-B*27 NegativeHLA allele interpretation for all loci based on IMGT/HLAdatabase version 3 21[facility] CLIA ID Number [id number] test was performed using PCR (Polymerase Chain Reaction)/SSOP(Sequence Specific Oligonucleotide Probes) technique  SBT (SequenceBased Typing) and/or SSP (Sequence Specific Primers) may be used assupplemental methods when necessary  Please contact HLA CustomerService at [phone] if you have any questions  Director of [address] [place] [address],[address] Monica Silver, PhD     (1) LUPUS ANTICOAGULANT PROFILE 87AZI3343 10:26AM Az Dinero Order Number: [id number]     Test Name Result Flag Reference   PTT LUPUS ANTICOAGULANT      DILUTE DAVID VIPER VENOM TIME      DILUTE PROTHROMBIN TIME(DPT)      THROMBIN TIME (Jinny Freed)      SEE WRITTEN REPORT FROM [facility]   DPT CONFIRM RATIO          Future Appointments    Date/Time Provider Specialty Site   11/14/2016 03:00 PM [facility]   07/22/2016 03:20 PM Eliane Means DO Rheumatology [facility]   08/04/2016 01:00 PM VIDHI Shaver   [place] [address] Signatures   Electronically signed by : Eric Michelle DO; Gold 10 2016  4:39PM EST                       (Author)

## 2018-01-18 NOTE — RESULT NOTES
Verified Results  (1) C-REACTIVE PROTEIN 09Apr2016 10:26AM Jyoti Sarkar Order Number: [id number]     Test Name Result Flag Reference   C-REACT PROTEIN 7 1 mg/L H <3 0     (1) LYME ANTIBODY PROFILE W/REFLEX TO WESTERN BLOT 09Apr2016 10:26AM Jyoti Sarkar Order Number: [id number]     Test Name Result Flag Reference   LYME IGG 0 04  0 00-0 79   NEGATIVE(0 00-0 79)-Absence of detectable Borrelia IgG Antibodies  A negative result does not exclude the possibility of Borrelia infection  If early Lyme disease is suspected,a second sample should be collected & tested 4 weeks after initial testing  LYME IGM 0 13  0 00-0 79   NEGATIVE (0 00-0 79)-Absence of detectable Borrelia IgM antibodies  A negative result does not exclude the possibility of Borrelia infection  If early lyme disease is suspected, a second sample should be collected & tested 4 weeks after initial testing      (1) SED RATE 09Apr2016 10:26AM Jyoti Sarkar Order Number: [id number]     Test Name Result Flag Reference   SED RATE 29 mm/hour H 0-20     (1) TSH WITH FT4 REFLEX 09Apr2016 10:26AM Jyoti Sarkar Order Number: [id number]    Patients undergoing fluorescein dye angiography may retain small amounts of fluorescein in the body for 48-72 hours post procedure  Samples containing fluorescein can produce falsely depressed TSH values  If the patient had this procedure,a specimen should be resubmitted post fluorescein clearance          The recommended reference ranges for TSH during pregnancy are as follows:  First trimester 0 1 to 2 5 uIU/mL  Second trimester  0 2 to 3 0 uIU/mL  Third trimester 0 3 to 3 0 uIU/m     Test Name Result Flag Reference   TSH 3 430 uIU/mL  0 358-3 740     (1) VITAMIN D 25-HYDROXY 09Apr2016 10:26AM Mello Jyoti Hook Order Number: [id number]     Order Number: [id number]     Test Name Result Flag Reference   VIT D 25-HYDROX 32 6 ng/mL  30 0-100 0     (1) LIPID PANEL, FASTING 09Apr2016 10:26AM Nadine Sarkar TW Order Number: [id number]      Triglyceride:         Normal              <150 mg/dl       Borderline High    150-199 mg/dl       High               200-499 mg/dl       Very High          >499 mg/dl  Cholesterol:         Desirable        <200 mg/dl      Borderline High  200-239 mg/dl      High             >239 mg/dl  HDL Cholesterol:        High    >59 mg/dL      Low     <41 mg/dL     Test Name Result Flag Reference   CHOLESTEROL 170 mg/dL     HDL,DIRECT 46 mg/dL  40-60   LDL CHOLESTEROL CALCULATED 102 mg/dL H 0-100   TRIGLYCERIDES 110 mg/dL  <=150   Specimen collection should occur prior to N-Acetylcysteine or Metamizole administration due to the potential for falsely depressed results  (1) COMPREHENSIVE METABOLIC PANEL [id number] 13:98ZV Ino Sarkar Order Number: [id number]      National Kidney Disease Education Program recommendations are as follows:  GFR calculation is accurate only with a steady state creatinine  Chronic Kidney disease less than 60 ml/min/1 73 sq  meters  Kidney failure less than 15 ml/min/1 73 sq  meters  Test Name Result Flag Reference   GLUCOSE,RANDM 86 mg/dL     If the patient is fasting, the ADA then defines impaired fasting glucose as > 100 mg/dL and diabetes as > or equal to 123 mg/dL     SODIUM 142 mmol/L  136-145   POTASSIUM 3 8 mmol/L  3 5-5 3   CHLORIDE 108 mmol/L  100-108   CARBON DIOXIDE 27 mmol/L  21-32   ANION GAP (CALC) 7 mmol/L  4-13   BLOOD UREA NITROGEN 13 mg/dL  5-25   CREATININE 0 86 mg/dL  0 60-1 30   Standardized to IDMS reference method   CALCIUM 8 8 mg/dL  8 3-10 1   BILI, TOTAL 0 46 mg/dL  0 20-1 00   ALK PHOSPHATAS 125 U/L H    ALT (SGPT) 52 U/L  12-78   AST(SGOT) 26 U/L  5-45   ALBUMIN 3 9 g/dL  3 5-5 0   TOTAL PROTEIN 7 5 g/dL  6 4-8 2   eGFR Non-African American      >60 0 [place]/SYM/3 37PC m     (1) CYCLIC CITRULLINATED PEPTIDE 09Apr2016 10:26AM Ino Sarkar Order Number: [id number]    Performed at:  [facility] [facility] [address], [place]  [id number]  : Randi Murrieta MD, Phone:  [phone]     Test Name Result Flag Reference   CYCLIC CITRULLINATED PEPTIDE 6 units  0 - 19   Negative               <20                          Weak positive      20 - 39                          Moderate positive  40 - 59                          Strong positive        >59     (1) MARCELINO SCREEN W/REFLEX TO TITER/PATTERN 09Apr2016 10:26AM [facility] Order Number: [id number]     Test Name Result Flag Reference   MARCELINO SCREEN  Negative  Negative     (1) DNA (DS) ANTIBODY 09Apr2016 10:26AM [facility] Order Number: [id number]    Performed at:  [address] [place] [address]  [id number]  : Jamie Fuentes MD, Phone:  [phone]     Test Name Result Flag Reference   DNA (DS) ANTIB 14 IU/mL H 0 - 9   Negative      <5                                   Equivocal  5 - 9                                   Positive      >9     (1) SJOGREN'S ANTIBODIES [id number] 10:26AM [facility] Order Number: [id number]    Performed at:  [address] [place] [address]  [id number]  : Jamie Fuentes MD, Phone:  [phone]     Test Name Result Flag Reference   SS-A <0 2 AI  0 0 - 0 9   SS-B <0 2 AI  0 0 - 0 9     (1) TISSUE TRANSGLUTAMINASE IGA 09Apr2016 10:26AM [facility] Order Number: [id number]    Performed at:  [address] [place] [address]  [id number]  : Jamie Fuentes MD, Phone:  [phone]     Test Name Result Flag Reference   tTG IGA <2 U/mL  0 - 3   Negative        0 -  3                              Weak Positive   4 - 10                              Positive           >10 Tissue Transglutaminase (tTG) has been identified as the endomysial antigen  Studies have demonstr- ated that endomysial IgA antibodies have over 99% specificity for gluten sensitive enteropathy         Discussion/Summary   your inflammatory markers sed rate and C-reactive protein are slightly elevated   one of the lupus labs was positive as well   all other labs are within the range   will continue to monitor for now   need to follow up with Rheumatologist as instructed during our visit   - Dr Nani Noriega   Electronically signed by : Jacqui Herrera MD; Apr 12 2016  8:29AM EST                       (Author)

## 2018-01-18 NOTE — MISCELLANEOUS
Message  Return to work or school:   Gely Kelly is under my professional care  She was seen in my office on 8/26/16   She is able to return to work on  8/29/16       STEFANY Kirkpatrick        Signatures   Electronically signed by : Clarisa Tolentino DO; Aug 26 2016  3:35PM EST                       (Author)

## 2018-01-22 VITALS
HEIGHT: 61 IN | RESPIRATION RATE: 15 BRPM | SYSTOLIC BLOOD PRESSURE: 118 MMHG | DIASTOLIC BLOOD PRESSURE: 68 MMHG | HEART RATE: 67 BPM | WEIGHT: 192.13 LBS | TEMPERATURE: 97.5 F | OXYGEN SATURATION: 98 % | BODY MASS INDEX: 36.27 KG/M2

## 2018-01-22 VITALS
BODY MASS INDEX: 34.74 KG/M2 | HEART RATE: 73 BPM | HEIGHT: 61 IN | SYSTOLIC BLOOD PRESSURE: 107 MMHG | DIASTOLIC BLOOD PRESSURE: 72 MMHG | WEIGHT: 184 LBS

## 2018-01-22 VITALS
DIASTOLIC BLOOD PRESSURE: 74 MMHG | OXYGEN SATURATION: 98 % | HEART RATE: 89 BPM | BODY MASS INDEX: 36.95 KG/M2 | WEIGHT: 188.19 LBS | HEIGHT: 60 IN | SYSTOLIC BLOOD PRESSURE: 116 MMHG

## 2018-01-23 VITALS
SYSTOLIC BLOOD PRESSURE: 110 MMHG | BODY MASS INDEX: 37.89 KG/M2 | WEIGHT: 193 LBS | DIASTOLIC BLOOD PRESSURE: 68 MMHG | HEART RATE: 74 BPM | HEIGHT: 60 IN | RESPIRATION RATE: 16 BRPM | TEMPERATURE: 97.7 F

## 2018-01-23 NOTE — MISCELLANEOUS
Message  Return to work or school:   Chiki Monroy is under my professional care  She was seen in my office on 12/04/2017   She is able to return to work on  12/14/2017   Any questions please feel free to contact my office [phone]    She is able to perform activities of daily living without limitations  Weight Bearing Status: Weight-Bearing As Tolerated  Spring Desai       Signatures   Electronically signed by : Rola Garcia MD; Dec  5 2017  9:24AM EST                       (Author)

## 2018-01-23 NOTE — MISCELLANEOUS
Message  Return to work or school:   Yolanda Merino is under my professional care  She was seen in my office on 12/1/17        Lisandra WATTS        Future Appointments    Signatures   Electronically signed by : Arthur Osborn; Dec  1 2017  9:44AM EST                       (Author)    Electronically signed by : Franco Wheeler DO; Dec  6 2017  9:13AM EST                       (Author)

## 2018-01-23 NOTE — CONSULTS
Chief Complaint  Consultation for positive anti cardiolipin IgM antibody  History of Present Illness  Patient is 45 year of age  When she was 15 she was hospitalized for 1 month with blood clot in her left leg below the knee  She states that she was not sent home on blood thinner  No blood clot since  She had 3 pregnancies and 2 live deliveries and there was no blood clot  She was not on blood thinner during pregnancies  She did not take birth control pills  She had hysterectomy for endometriosis 13 years ago and again there was no blood clot  No family history of blood clot  6 years ago she was diagnosed to have lupus and she has been on Plaquenil 200 mg twice a day and 81 mg aspirin daily  On routine blood work she was found to have anticardiolipin IgM antibody, 36  Negative lupus anticoagulant  Negative beta 2 glycoprotein antibody  MARCELINO titer 160  No family history of blood clot  6 years ago she had bronchoscopy and biopsy of lung nodule and that was benign and there was no follow-up  Off and on she has swelling of left upper neck lymph node  Has some tiredness  Off and on shortness of breath  Off and on swelling of ankles when she is on her feet too long  History of abnormal mammogram and she was getting mammography every 6 months but now she will be going for screening mammography in 1 year  Migraine headaches  Review of Systems    No  seizures, diplopia, dysphagia, hoarseness, angina pain, chest pain, palpitations,  cough, hemoptysis, abdominal pain, melena, or hematuria  No fever, chills, bleeding, bone pains, skin rash, weight loss, nausea, vomiting and no change in bowel habits  Appetite is fair  No night sweats  Patient has arthritic symptoms  No leg cramps  Not unusually sensitive to heat or cold  No recent or frequent infections  Anxious  No GYN symptoms  Reviewed 14 systems  Other symptoms as in HPI  ROS reviewed  Active Problems    1   Abnormal mammogram of right breast (793 80) (R92 8)   2  Acute maxillary sinusitis (461 0) (J01 00)   3  Adhesive capsulitis of right shoulder (726 0) (M75 01)   4  Carpal tunnel syndrome on left (354 0) (G56 02)   5  Cervical radiculopathy (723 4) (M54 12)   6  Chronic migraine (346 70) (G43 709)   7  Chronic venous insufficiency (459 81) (I87 2)   8  Diabetes mellitus screening (V77 1) (Z13 1)   9  Encounter for long term current azathioprine therapy (V58 69) (Z79 899)   10  Encounter for mammogram to establish baseline mammogram (V76 12) (Z12 31)   11  Fatigue (780 79) (R53 83)   12  Hemorrhoids, external (455 3) (K64 4)   13  Migraine, unspecified, not intractable, without status migrainosus (346 90) (G43 909)   14  Myofascial pain syndrome (729 1) (M79 1)   15  Need for lipid screening (V77 91) (Z13 220)   16  Rotator cuff syndrome, right (726 10) (M75 101)   17  Swelling of both lower extremities (729 81) (M79 89)   18  Systemic lupus erythematosus (710 0) (M32 9)   19  Visit for screening mammogram (V76 12) (Z12 31)    Past Medical History    · History of Arthralgia of multiple joints (719 49) (M25 50)   · History of systemic lupus erythematosus (SLE) (V12 29) (Z87 39)   · History of Left leg DVT (453 40) (I82 402)   · History of Lipid screening (V77 91) (Z13 220)   · History of Lupus (695 4) (L93 0)   · Migraine, unspecified, not intractable, without status migrainosus (346 90) (G43 909)   · History of Partial tear of right rotator cuff (840 4) (M75 111)    The active problems and past medical history were reviewed and updated today  Surgical History    · History of Hysterectomy    The surgical history was reviewed and updated today         Family History    · Family history of chronic obstructive pulmonary disease (V17 6) (Z82 5)   · Family history of essential hypertension (V17 49) (Z82 49)   · Family history of myocardial infarction (V17 3) (Z82 49)    · Family history of diabetes mellitus (V18 0) (Z83 3)   · Family history of hypertension (V17 49) (Z82 49)   · Family history of malignant neoplasm of prostate (V16 42) (Z80 42)   · Family history of High cholesterol    · Family history of rheumatoid arthritis (V17 7) (Z82 61)    · Family history of diabetes mellitus (V18 0) (Z83 3)    · Family history of     · Family history of     · Family history of     · Family history of     · Family history of systemic lupus erythematosus (V19 4) (Z82 69)    · Family history of chronic obstructive pulmonary disease (V17 6) (Z82 5)   · Denied: Family history of Crohn's disease   · Denied: Family history of psoriasis   · Denied: Family history of ulcerative colitis    The family history was reviewed and updated today  Social History    · Caffeine use (V49 89) (F15 90)   · Completed 12th grade   ·    · Employed   · [employer] [address]   · Never a smoker   · No alcohol use   · No drug use   · No illicit drug use   · Non-smoker (V49 89) (Z78 9)   · Occupation   ·    · Denied: History of Social alcohol use  The social history was reviewed and updated today  Current Meds   1  Aspirin 81 MG TABS; TAKE 1 TABLET DAILY; Therapy: (Recorded:94Hbs6744) to Recorded   2  Butalbital-APAP-Caffeine -40 MG Oral Capsule; TAKE 1 CAPSULE Every 6 hours   PRN; Therapy: 89TBU0278 to (Last Rx:41Ycp4903) Ordered   3  Mobic 7 5 MG/5ML SUSP; TAKE 5 ML DAILY AS NEEDED; Therapy: (Recorded:81Vwc3461) to Recorded   4  Plaquenil 200 MG Oral Tablet; TAKE 1 TABLET TWICE DAILY; Therapy: (Recorded:46Rtx5029) to Recorded   5  Propranolol HCl - 40 MG Oral Tablet; Take 1 tablet twice daily; Therapy: 94QMA6543 to (Evaluate:63Omw3830); Last Rx:15Ikh6486 Ordered   6  Zomig 5 MG Nasal Solution; USE AS DIRECTED; Therapy: 06JQO6468 to (Last Rx:08Oph2698) Ordered    The medication list was reviewed and updated today  Allergies    1   No Known Drug Allergies    Reviewed     Vitals   Recorded: 64ZVI0737 01:53PM Temperature 97 5 F   Heart Rate 67   Respiration 15   Systolic 306   Diastolic 68   Height 5 ft 1 in   Weight 192 lb 2 oz   BMI Calculated 36 3   BSA Calculated 1 86   O2 Saturation 98   Pain Scale 0       Reviewed     Physical Exam       Patient is alert and oriented and not in any acute distress  Stable vitals  Is No icterus  No oral thrush  No palpable neck mass  Lung fields are clear to percussion and auscultation  Heart rate is regular  Abdomen soft and nontender  No palpable abdominal mass  No ascites  No edema of ankles  No calf tenderness  No focal neurological deficit  No skin rash  No palpable lymphadenopathy  Good arterial pulses  No clubbing  Anxious  Performance status one  Her primary physician examined her breasts  ECOG 1       Results/Data  VAS LOWER LIMB VENOUS DUPLEX STUDY, UNILATERAL/LIMITED 48ABB4342 02:29PM Jackelyn Arias Pickup     Test Name Result Flag Reference   VAS LOWER LIMB VENOUS DUPLEX STUDY, UNILATERAL/LIMITED (Report)     [facility] REPORT   CLINICAL:   Indications: Left Swelling of Limb [R22 4]  The patient has a history of lupus, and her doctor said her blood work showed   she is prone to blood clots  She reports a history of blood clots in the past    She does take an aspirin daily  She states she woke up this morning and her left leg is swollen, she states it   appears to be the whole leg  Operative History:   Hysterectomy      FINDINGS:      Segment Right      Left          Impression    Impression       CFV   Normal (Patent) Normal (Patent)             CONCLUSION:   Impression:   RIGHT LOWER LIMB LIMITED: NORMAL   Evaluation shows no evidence of thrombus in the common femoral vein  Doppler evaluation shows a normal response to augmentation maneuvers  LEFT LOWER LIMB: NORMAL   No evidence of acute or chronic deep vein thrombosis   No evidence of superficial thrombophlebitis noted     Doppler evaluation shows a normal response to augmentation maneuvers  Popliteal, posterior tibial arterial Doppler waveforms are triphasic  Tech Note: Preliminary report called to Terra Murray at physician office  3:30pm  KT      SIGNATURE:   Electronically Signed by: Bartolome Jurado MD, RPVI on 2017-11-17 04:40:14 PM     (1) CBC/PLT/DIFF 23Oct2017 10:58AM Hugo Salbador Holstein     Test Name Result Flag Reference   WBC COUNT 3 24 Thousand/uL L 4 31-10 16   RBC COUNT 5 18 Million/uL H 3 81-5 12   HEMOGLOBIN 14 9 g/dL  11 5-15 4   HEMATOCRIT 45 1 %  34 8-46  1   MCV 87 fL  82-98   MCH 28 8 pg  26 8-34 3   MCHC 33 0 g/dL  31 4-37 4   RDW 14 6 %  11 6-15 1   MPV 10 5 fL  8 9-12 7   PLATELET COUNT 402 Thousands/uL  149-390   nRBC AUTOMATED 0 /100 WBCs     NEUTROPHILS RELATIVE PERCENT 54 %  43-75   LYMPHOCYTES RELATIVE PERCENT 38 %  14-44   MONOCYTES RELATIVE PERCENT 7 %  4-12   EOSINOPHILS RELATIVE PERCENT 0 %  0-6   BASOPHILS RELATIVE PERCENT 1 %  0-1   NEUTROPHILS ABSOLUTE COUNT 1 72 Thousands/? ??L L 1 85-7 62   LYMPHOCYTES ABSOLUTE COUNT 1 24 Thousands/? ??L  0 60-4 47   MONOCYTES ABSOLUTE COUNT 0 24 Thousand/? ??L  0 17-1 22   EOSINOPHILS ABSOLUTE COUNT 0 01 Thousand/? ??L  0 00-0 61   BASOPHILS ABSOLUTE COUNT 0 02 Thousands/? ??L  0 00-0 10   This is a patient instruction: This test is non-fasting  Please drink two glasses of water morning of bloodwork  (1) T4, FREE 23Oct2017 10:58AM Bijan Arias     Test Name Result Flag Reference   T4,FREE 1 02 ng/dL  0 76-1 46   Specimen collection should occur prior to Sulfasalazine administration due to the potential for falsely elevated results       (1) C-REACTIVE PROTEIN 23Oct2017 10:58AM [facility]     Test Name Result Flag Reference   C-REACT PROTEIN 5 1 mg/L H <3 0     (1) COMPREHENSIVE METABOLIC PANEL 99XTC4068 53:63EG Bijan Arias     Test Name Result Flag Reference   GLUCOSE,RANDM 83 mg/dL     If the patient is fasting, the ADA then defines impaired fasting glucose as > 100 mg/dL and diabetes as > or equal to 123 mg/dL  Specimen collection should occur prior to Sulfasalazine administration due to the potential for falsely depressed results  Specimen collection should occur prior to Sulfapyridine administration due to the potential for falsely elevated results  SODIUM 138 mmol/L  136-145   POTASSIUM 3 4 mmol/L L 3 5-5 3   CHLORIDE 105 mmol/L  100-108   CARBON DIOXIDE 27 mmol/L  21-32   ANION GAP (CALC) 6 mmol/L  4-13   BLOOD UREA NITROGEN 15 mg/dL  5-25   CREATININE 0 81 mg/dL  0 60-1 30   Standardized to IDMS reference method   CALCIUM 9 2 mg/dL  8 3-10 1   BILI, TOTAL 0 36 mg/dL  0 20-1 00   ALK PHOSPHATAS 109 U/L     ALT (SGPT) 78 U/L  12-78   Specimen collection should occur prior to Sulfasalazine and/or Sulfapyridine administration due to the potential for falsely depressed results  AST(SGOT) 49 U/L H 5-45   Specimen collection should occur prior to Sulfasalazine administration due to the potential for falsely depressed results  ALBUMIN 3 9 g/dL  3 5-5 0   TOTAL PROTEIN 8 4 g/dL H 6 4-8 2   eGFR 84 ml/min/1 73sq m     National Kidney Disease Education Program recommendations are as follows:  GFR calculation is accurate only with a steady state creatinine  Chronic Kidney disease less than 60 ml/min/1 73 sq  meters  Kidney failure less than 15 ml/min/1 73 sq  meters  (1) TSH 23Oct2017 10:58AM Bijan Arias     Test Name Result Flag Reference   TSH 4 450 uIU/mL H 0 358-3 740   This is a patient instruction: This test is non-fasting  Please drink two glasses of water morning of bloodwork  Patients undergoing fluorescein dye angiography may retain small amounts of fluorescein in the body for 48-72 hours post procedure  Samples containing fluorescein can produce falsely depressed TSH values  If the patient had this procedure,a specimen should be resubmitted post fluorescein clearance            The recommended reference ranges for TSH during pregnancy are as follows:  First trimester 0 1 to 2 5 uIU/mL  Second trimester  0 2 to 3 0 uIU/mL  Third trimester 0 3 to 3 0 uIU/m     (1) SED RATE 23Oct2017 10:58AM Bijan Arias     Test Name Result Flag Reference   SED RATE 11 mm/hour  0-20     (1) RHEUMATOID FACTOR SCREEN 23Oct2017 10:58AM Hugo, Reather Minium     Test Name Result Flag Reference   RHEUMATOID FACTOR Negative  Negative     (1) ACUTE HEPATITIS PANEL [id number] 10:58AM Maira Nunez     Test Name Result Flag Reference   HEPATITIS B SURFACE ANTIGEN Non-reactive  Non-reactive, NonReactive - Confirmed   HEPATITIS A IGM ANTIBODY Non-reactive  Non-reactive, Equivocal-Suggest Recollect   HEPATITIS C ANTIBODY Non-reactive  Non-reactive   HEPATITIS B CORE IGM ANTIBODY Non-reactive  Non-reactive     (1) DNA (DS) ANTIBODY 23Oct2017 10:58AM Bijan Arias     Test Name Result Flag Reference   ANTI DNA DOUBLE STRANDED 16 IU/mL H 0 - 9   Negative      <5                                     Equivocal  5 - 9                                     Positive      >9  Performed at:  [address] [place] [address] [facility] [address]  [id number]  : Rayne Reyes MD, Phone:  [phone]     (1) [address] [place] [address] [id number] 10:58AM Maira Nunez     Test Name Result Flag Reference   RNP AB CIE ID <0 2 AI  0 0 - 0 9   Performed at:  [address] [place] [address] [facility] [address]  [id number]  : Rayne Reyes MD, Phone:  [phone]   Eldon AB CIE ID <0 2 AI  0 0 - 0 9     (1) [address] [place], [place] [id number] 10:58AM Hugo Reather Minium     Test Name Result Flag Reference   SS-A <0 2 AI  0 0 - 0 9   SS-B <0 2 AI  0 0 - 0 9   Performed at:  [address] [place] [address] [facility] [address]  [id number]  : Rayne Reyes MD, Phone:  [phone]     (1) ANTICARDIOLIPIN ANTIBODY 23Oct2017 10:58AM Bijan Arias     Test Name Result Flag Reference   CARDLIP IGA AB <9 APL U/mL  0 - 11   Negative:              <12                            Indeterminate:     12 - 20 Low-Med Positive: >20 - 80                            High Positive:         >80   CARDLIP IGG AB <9 GPL U/mL  0 - 14   Negative:              <15                            Indeterminate:     15 - 20                            Low-Med Positive: >20 - 80                            High Positive:         >80   CARDLIP IGM AB 36 MPL U/mL H 0 - 12   Negative:              <13                            Indeterminate:     13 - 20                            Low-Med Positive: >20 - 80                            High Positive:         >80  Performed at:  [address]  [id number]  : Giuseppe Hassan MD, Phone:  [phone]     (1) CYCLIC CITRULLINATED PEPTIDE 23Oct2017 10:58AM Sly Arias     Test Name Result Flag Reference   CYCLIC CITRULLINATED PEPTIDE 4 units  0 - 19   Negative               <20                            Weak positive      20 - 39                            Moderate positive  40 - 59                            Strong positive        >59  Performed at:  [address]  [id number]  : Teresita Thomas MD, Phone:  [phone]     (1) [address] [employer] [facility], [address] [place] [address] 10:58AM Bijan Arias     Test Name Result Flag Reference   BETA-2 GLYCOPROTEIN I AB,IGG <9 GPI IgG units  0 - 20   The reference interval reflects a 3SD or 99th percentile interval,  which is thought to represent a potentially clinically significant  result in accordance with the International Consensus Statement on  the classification criteria for definitive antiphospholipid syndrome  (APS)  J Thromb Haem 2006;4:295-306     BETA-2 GLYCOPROTEIN I AB,IGM <9 GPI IgM units  0 - 32   The reference interval reflects a 3SD or 99th percentile interval,  which is thought to represent a potentially clinically significant  result in accordance with the International Consensus Statement on  the classification criteria for definitive antiphospholipid syndrome  (APS)  J Thromb Haem 2006;4:295-306  Performed at:  [address]  [id number]  : Frank Hutchins MD, Phone:  [phone]   [facility] <9 GPI IgA units  0 - 25   The reference interval reflects a 3SD or 99th percentile interval,  which is thought to represent a potentially clinically significant  result in accordance with the International Consensus Statement on  the classification criteria for definitive antiphospholipid syndrome  (APS)  J Thromb Haem 2006;4:295-306  (1) MARCELINO SCREEN W/REFLEX TO TITER/PATTERN [id number] 10:58AM Sami Arias     Test Name Result Flag Reference   MARCELINO SCREEN  Positive A Negative   MARCELINO TITER 1 Titer of 160     MARCELINO PATTERN 1 Speckled pattern       (1) PROTEIN ELECTRO, SERUM 23Oct2017 10:58AM Bijan Arias     Test Name Result Flag Reference   A/G RATIO 1 43  1 10-1 80   Albumin 58 9 %  52 0-65 0   Albumin Conc  4 59 g/dl  3 50-5 00   Alpha 1 Conc  0 36 g/dL  0 10-0 40   ALPHA 1 4 6 %  2 5-5 0   Alpha 2 Conc  0 79 g/dL  0 40-1 20   ALPHA 2 10 1 %  7 0-13 0   Beta 1 Conc  0 44 g/dL  0 40-0 80   BETA-1 5 7 %  5 0-13 0   Beta 2 Conc 0 36 g/dL  0 20-0 50   BETA-2 4 6 %  2 0-8 0   Gamma Conc 1 26 g/dL  0 50-1 60   GAMMA GLOBULIN 16 1 %  12 0-22 0   Interpretation      The serum total protein, albumin and electrophoresis are within normal limits  No monoclonal bands noted  Reviewed by: Anisha Lara MD ([id number]) **Electronic Signature**   TOTAL PROTEIN   7 8 g/dL  6 4-8 2     (1) LUPUS ANTICOAGULANT PROFILE 23Oct2017 10:58AM Sami Arias     Test Name Result Flag Reference   PTT LUPUS ANTICOAGULANT 55 3 sec H 0 0 - 51 9   DILUTE DAVID VIPER VENOM TIME 54 6 sec H 0 0 - 47 0   DILUTE PROTHROMBIN TIME(DPT) 55 5 sec H 0 0 - 55 0   THROMBIN TIME (DRVW) 21 7 sec  0 0 - 23 0   DPT CONFIRM RATIO 0 80 Ratio  0 00 - 1 40   LUPUS REFLEX INTERPRETATION Comment:     No lupus anticoagulant was detected  Mixing studies suggest the presence  of an inhibitor and this could represent a specific factor inhibitor  (e g  to factor VIII, IX, or XI), or a direct Xa inhibitor anticoagulant  such as rivaroxaban, apixaban or edoxaban  The dPT was extended but the  dPT confirmatory ratio was normal  This is consistent with a deficiency or  specific inhibition of one or more extrinsic pathway  factors (VII, X, V, II or fibrinogen)  Performed at:  [address]  [id number]  : Frank Hutchins MD, Phone:  [phone]   DRVVT MIX INTERPRETATION 42 7 sec  0 0 - 47 0   Performed at:  [address]  [id number]  : Frank Hutchins MD, Phone:  [phone]   PTT-LA MIX 49 6 sec H 0 0 - 48 9   Performed at:  [address]  [id number]  : Frank Hutchins MD, Phone:  [phone]   HEXAGONAL PHOSPHOLIPID NEUTRALIZAT TEST 5 sec  0 - 11   Performed at:  [address]  [id number]  : Frank Hutchins MD, Phone:  [phone]     Assessment    1  Anti-cardiolipin antibody positive (795 79) (R76 8)   2  Lung nodule (793 11) (R91 1)   3  Leukopenia (288 50) (D72 819)    Plan  Anti-cardiolipin antibody positive    · (1) ANTICARDIOLIPIN ANTIBODY; Status:Active; Requested for:20Ehr8704;    Perform:[facility]; Due:50Axp1713; Ordered; For:Anti-cardiolipin antibody positive; Ordered By:Proothi, Eric;  Leukopenia    · We recommend that you examine your own breasts for lumps and other changes ;  Status:Complete;   Done: 78RUV2981   Ordered; For:Leukopenia; Ordered By:Proothi, Eric;   · (1) CBC/PLT/DIFF; Status:Active; Requested for:21Jwy2471;    Perform:[facility]; Due:08Kux6244; Ordered; For:Leukopenia;  Ordered By:Proothi, Eric;   · Follow-up visit in 1 month Evaluation and Treatment  Follow-up  Status: Complete  Done:  36SXL6254 09:40AM   Ordered; For: Leukopenia; Ordered By: Camila Abbasi Performed:  Due: 71CKR5111; Last Updated By: Salena Willard; 12/15/2017 2:55:23 PM  Lung nodule    · * CT CHEST WO CONTRAST; Status:Need Information - Financial Authorization; Requested for:[id number];    Perform:[facility]; Due:83Onl3302; Last Updated Nathaniel Millan; 12/15/2017 2:57:49 PM;Ordered; For:Lung nodule; Ordered By:Eric Carreon;    Discussion/Summary    Patient is 45 year of age  When she was 15 she was hospitalized for 1 month with blood clot in her left leg below the knee  She states that she was not sent home on blood thinner  No blood clot since  She had 3 pregnancies and 2 live deliveries and there was no blood clot  She was not on blood thinner during pregnancies  She did not take birth control pills  She had hysterectomy for endometriosis 13 years ago and again there was no blood clot  No family history of blood clot  6 years ago she was diagnosed to have lupus and she has been on Plaquenil 200 mg twice a day and 81 mg aspirin daily  On routine blood work she was found to have anticardiolipin IgM antibody, 36  Negative lupus anticoagulant  Negative beta 2 glycoprotein antibody  MARCELINO titer 160  No family history of blood clot  6 years ago she had bronchoscopy and biopsy of lung nodule and that was benign and there was no follow-up  Off and on she has swelling of left upper neck lymph node  Has some tiredness  Off and on shortness of breath  Off and on swelling of ankles when she is on her feet too long  History of abnormal mammogram and she was getting mammography every 6 months but now she will be going for screening mammography in 1 year  Migraine headaches  Physical examination and test results are as recorded and discussed   She has positive anticardiolipin IgM antibody, positive MARCELINO, history of phlebitis at age 11 but no blood thinner post discharge from the hospital and no blood clot later on not even during pregnancy and surgery and no family history of blood clot  Negative venous Doppler study  She would be at increased risk of blood clot because of positive anticardiolipin IgM antibody  She is on 81 mg aspirin daily and she will continue to take that  Blood will be checked for anticardiolipin antibody again  History of lung nodule 6 years ago and that will be rechecked by CT scan  Leukopenia/neutropenia and that could be secondary to lupus or Plaquenil  Explained all this to the patient in detail especially how to prevent blood clot  Questions answered  She has follow-up office appointment  The patient was counseled regarding diagnostic results, patient and family education, impressions  total time of encounter was 75 minutes and 45 minutes was spent counseling  The patient has the current Goals: Prevention of blood clot  Follow-up on lung nodule  Prevention of febrile neutropenia  The patent has the current Barriers: No barriers  Patient is able to Self-Care  The treatment plan was reviewed with the patient/guardian   The patient/guardian understands and agrees with the treatment plan     Self Referrals: No      Signatures   Electronically signed by : Wyatt Bernal MD; Dec 17 2017  8:20AM EST                       (Author)

## 2018-01-26 ENCOUNTER — OFFICE VISIT (OUTPATIENT)
Dept: URGENT CARE | Age: 53
End: 2018-01-26
Payer: COMMERCIAL

## 2018-01-26 VITALS
WEIGHT: 189 LBS | BODY MASS INDEX: 37.11 KG/M2 | SYSTOLIC BLOOD PRESSURE: 118 MMHG | HEART RATE: 84 BPM | DIASTOLIC BLOOD PRESSURE: 80 MMHG | TEMPERATURE: 98.3 F | HEIGHT: 60 IN

## 2018-01-26 DIAGNOSIS — J01.00 ACUTE NON-RECURRENT MAXILLARY SINUSITIS: Primary | ICD-10-CM

## 2018-01-26 PROCEDURE — 99213 OFFICE O/P EST LOW 20 MIN: CPT | Performed by: FAMILY MEDICINE

## 2018-01-26 PROCEDURE — S9088 SERVICES PROVIDED IN URGENT: HCPCS | Performed by: FAMILY MEDICINE

## 2018-01-26 RX ORDER — HYDROXYCHLOROQUINE SULFATE 200 MG/1
1 TABLET, FILM COATED ORAL 2 TIMES DAILY
COMMUNITY

## 2018-01-26 RX ORDER — AMOXICILLIN AND CLAVULANATE POTASSIUM 875; 125 MG/1; MG/1
1 TABLET, FILM COATED ORAL EVERY 12 HOURS SCHEDULED
Qty: 20 TABLET | Refills: 0 | Status: SHIPPED | OUTPATIENT
Start: 2018-01-26 | End: 2018-02-05

## 2018-01-26 RX ORDER — MULTIVIT-MIN/IRON/FOLIC ACID/K 18-600-40
500 CAPSULE ORAL
COMMUNITY
Start: 2016-10-20

## 2018-01-26 RX ORDER — MULTIVIT-MIN/IRON/FOLIC ACID/K 18-600-40
2000 CAPSULE ORAL
COMMUNITY
Start: 2016-10-20

## 2018-01-26 RX ORDER — BUTALBITAL/ASPIRIN/CAFFEINE 50-325-40
CAPSULE ORAL
COMMUNITY
Start: 2016-10-20 | End: 2018-01-26 | Stop reason: ALTCHOICE

## 2018-01-27 NOTE — PATIENT INSTRUCTIONS
Take antibiotic as directed until completed  Take antibiotic with food and full glass of water  Take a probiotic while taking this medication  Take Mucinex and Flonase as directed, for congestion  Take Advil or Tylenol as directed for muscle aches and fever  Use cool mist humidifier, turning on hours prior to bedtime for maximum relief  Take all medications with food and a full glass of water  Get plenty of rest and lots of fluids  Use throat lozenges, saltwater gargle, and warm honey water as needed for throat relief  If symptoms worsen or if not resolving, call PCP or go to the ER  Rhinosinusitis   WHAT YOU NEED TO KNOW:   Rhinosinusitis (RS) is inflammation of your nose and sinuses  It commonly begins as a virus, often as a common cold  Viruses usually last 7 to 10 days and do not need treatment  When the virus does not get better on its own, you may have bacterial RS  This means that bacteria have begun to grow inside your sinuses  Acute RS lasts less than 4 weeks  Chronic RS lasts 12 weeks or more  Recurrent RS is when you have 4 or more episodes of RS in one year  DISCHARGE INSTRUCTIONS:   Return to the emergency department if:   · Your eye and eyelid are red, swollen, and painful  · You cannot open your eye  · You have double vision or you cannot see  · Your eyeball bulges out or you cannot move your eye  · You are more sleepy than normal or you notice changes in your ability to think, move, or talk  · You have a stiff neck, a fever, or a bad headache  · You have swelling of your forehead or scalp  Contact your healthcare provider if:   · Your symptoms are worse or do not improve after 3 to 5 days of treatment  · You have questions or concerns about your condition or care  Medicines: You may need any of the following:  · Acetaminophen  decreases pain and fever  It is available without a doctor's order  Ask how much to take and how often to take it  Follow directions  Acetaminophen can cause liver damage if not taken correctly  · NSAIDs , such as ibuprofen, help decrease swelling, pain, and fever  This medicine is available with or without a doctor's order  NSAIDs can cause stomach bleeding or kidney problems in certain people  If you take blood thinner medicine, always ask your healthcare provider if NSAIDs are safe for you  Always read the medicine label and follow directions  · Nasal steroid sprays  decrease inflammation in your nose and sinuses  · Decongestants  reduce swelling and drain mucus in the nose and sinuses  They may help you breathe easier  · Antihistamines  dry mucus in the nose and relieve sneezing  · Antibiotics  treat a bacterial infection and may be needed if your symptoms do not improve or they get worse  · Take your medicine as directed  Contact your healthcare provider if you think your medicine is not helping or if you have side effects  Tell him or her if you are allergic to any medicine  Keep a list of the medicines, vitamins, and herbs you take  Include the amounts, and when and why you take them  Bring the list or the pill bottles to follow-up visits  Carry your medicine list with you in case of an emergency  Self-care:   · Rinse your sinuses  Use a sinus rinse device to rinse your nasal passages with a saline (salt water) solution  This will help thin the mucus in your nose and rinse away pollen and dirt  It will also help reduce swelling so you can breathe normally  Ask your healthcare provider how often to do this  · Breathe in steam   Heat a bowl of water until you see steam  Lean over the bowl and make a tent over your head with a large towel  Breathe deeply for about 20 minutes  Be careful not to get too close to the steam or burn yourself  Do this 3 times a day  You can also breathe deeply when you take a hot shower  · Sleep with your head elevated    Place an extra pillow under your head before you go to sleep to help your sinuses drain  · Drink liquids as directed  Ask your healthcare provider how much liquid to drink each day and which liquids are best for you  Liquids will thin the mucus in your nose and help it drain  Avoid drinks that contain alcohol or caffeine  · Do not smoke, and avoid secondhand smoke  Nicotine and other chemicals in cigarettes and cigars can make your symptoms worse  Ask your healthcare provider for information if you currently smoke and need help to quit  E-cigarettes or smokeless tobacco still contain nicotine  Talk to your healthcare provider before you use these products  Follow up with your healthcare provider as directed: Follow up if your symptoms are worse or not better after 3 to 5 days of treatment  Write down your questions so you remember to ask them during your visits  © 2017 [facility] [facility] Information is for End User's use only and may not be sold, redistributed or otherwise used for commercial purposes  All illustrations and images included in CareNotes® are the copyrighted property of A D A M , Inc  or Delonte [employer]  The above information is an  only  It is not intended as medical advice for individual conditions or treatments  Talk to your doctor, nurse or pharmacist before following any medical regimen to see if it is safe and effective for you

## 2018-01-27 NOTE — PROGRESS NOTES
Assessment/Plan:      Diagnoses and all orders for this visit:    Acute non-recurrent maxillary sinusitis  -     amoxicillin-clavulanate (AUGMENTIN) 875-125 mg per tablet; Take 1 tablet by mouth every 12 (twelve) hours for 10 days    Other orders  -     Discontinue: butalbital-acetaminophen-caffeine-codeine (FIORICET WITH CODEINE) -05-30 MG per capsule; As needed  -     aspirin 81 MG tablet; Take 1 tablet by mouth daily  -     Ascorbic Acid (VITAMIN C) 500 MG CAPS; Take 500 mg by mouth  -     Cholecalciferol (VITAMIN D) 2000 units CAPS; Take 2,000 Units by mouth  -     hydroxychloroquine (PLAQUENIL) 200 mg tablet; Take 1 tablet by mouth 2 (two) times a day      Take antibiotic as directed until completed  Take antibiotic with food and full glass of water  Take a probiotic while taking this medication  Take Mucinex and Flonase as directed, for congestion  Take Advil or Tylenol as directed for muscle aches and fever  Use cool mist humidifier, turning on hours prior to bedtime for maximum relief  Take all medications with food and a full glass of water  Get plenty of rest and lots of fluids  Use throat lozenges, saltwater gargle, and warm honey water as needed for throat relief  If symptoms worsen or if not resolving, call PCP or go to the ER  Subjective:     Patient ID: Elsa Hargrove is a 47 y o  female  Patient is a 46 y/o female presenting with complaint of b/l maxillary sinus pressure x 5days  Symptoms associated with chills early on in illness, now resolved, nasal congestion productive of yellow drainage, sore throat, and hoarseness  Early morning nausea that resolves soon after patient is upright  Treating with mucinex with some relief of symptoms, though overall, patient feels symptoms are not resolving  No sick contacts or recent travel  Review of Systems   Constitutional: Positive for chills (now resolved  ) and fatigue  Negative for fever     HENT: Positive for congestion, rhinorrhea, sinus pain, sore throat and voice change  Negative for ear pain and sneezing  Respiratory: Positive for cough, shortness of breath and wheezing  Cardiovascular: Negative for chest pain and palpitations  Gastrointestinal: Positive for nausea  Negative for abdominal pain, diarrhea and vomiting  Objective:     Physical Exam   Constitutional: She appears well-developed and well-nourished  No distress  HENT:   Head: Normocephalic and atraumatic  E: WNL  N: inflamed mucosa and turbinates  T: WNL  PND present  Maxillary sinus TTP b/l  Eyes: Conjunctivae are normal    Cardiovascular: Normal rate, regular rhythm and normal heart sounds  Pulmonary/Chest: Effort normal and breath sounds normal  No respiratory distress  She has no wheezes  She has no rales

## 2018-01-29 ENCOUNTER — TELEPHONE (OUTPATIENT)
Dept: NEUROLOGY | Facility: CLINIC | Age: 53
End: 2018-01-29

## 2018-01-29 NOTE — TELEPHONE ENCOUNTER
Lmom to reschedule pt with Magdaleno Posadas for [facility] office, pt has appointment with Dr Kirsten Flaherty for 2/16/18 however we have 2/13/18 is available sooner with Tyshawn

## 2018-01-30 ENCOUNTER — TELEPHONE (OUTPATIENT)
Dept: NEUROLOGY | Facility: CLINIC | Age: 53
End: 2018-01-30

## 2018-01-30 DIAGNOSIS — G43.709 CHRONIC MIGRAINE WITHOUT AURA WITHOUT STATUS MIGRAINOSUS, NOT INTRACTABLE: Primary | ICD-10-CM

## 2018-01-30 RX ORDER — SUMATRIPTAN 100 MG/1
100 TABLET, FILM COATED ORAL ONCE AS NEEDED
Qty: 10 TABLET | Refills: 1 | Status: SHIPPED | OUTPATIENT
Start: 2018-01-30

## 2018-01-30 NOTE — TELEPHONE ENCOUNTER
Pt's Zomig 5mg nasal spray prior authorization was denied by her insurance  - they denied this request because there was no documentation that the member is not using concurrent opioid or barbiturate therapy for migraine treatment and she also has not tried and failed sumatriptan nasal spray  Other covered alternatives are: sumatriptan, cafergot, rizatriptan, rizatriptan ODT, Migranal, DHE, and naratriptan  Please advise if you would like to order an alternative?

## 2018-02-12 ENCOUNTER — TELEPHONE (OUTPATIENT)
Dept: HEMATOLOGY ONCOLOGY | Facility: CLINIC | Age: 53
End: 2018-02-12

## 2018-02-22 ENCOUNTER — TELEPHONE (OUTPATIENT)
Dept: NEUROLOGY | Facility: CLINIC | Age: 53
End: 2018-02-22

## 2018-02-22 DIAGNOSIS — G43.709 CHRONIC MIGRAINE WITHOUT AURA: Primary | ICD-10-CM

## 2018-02-22 RX ORDER — SUMATRIPTAN 5 MG/1
1 SPRAY NASAL 2 TIMES DAILY PRN
Qty: 1 INHALER | Refills: 0 | Status: SHIPPED | OUTPATIENT
Start: 2018-02-22 | End: 2018-05-21 | Stop reason: SDUPTHER

## 2018-02-22 NOTE — TELEPHONE ENCOUNTER
Patient called to report that the Zomig nasal spray is not covered under her insurance  She has not tried sumatriptan nasal spray  Please advise

## 2018-03-03 ENCOUNTER — APPOINTMENT (OUTPATIENT)
Dept: LAB | Facility: HOSPITAL | Age: 53
End: 2018-03-03
Payer: COMMERCIAL

## 2018-03-03 ENCOUNTER — TRANSCRIBE ORDERS (OUTPATIENT)
Dept: LAB | Facility: HOSPITAL | Age: 53
End: 2018-03-03

## 2018-03-03 DIAGNOSIS — I43 DILATED CARDIOMYOPATHY SECONDARY TO SYSTEMIC LUPUS ERYTHEMATOSUS (HCC): Primary | ICD-10-CM

## 2018-03-03 DIAGNOSIS — I43 DILATED CARDIOMYOPATHY SECONDARY TO SYSTEMIC LUPUS ERYTHEMATOSUS (HCC): ICD-10-CM

## 2018-03-03 DIAGNOSIS — M32.19 DILATED CARDIOMYOPATHY SECONDARY TO SYSTEMIC LUPUS ERYTHEMATOSUS (HCC): ICD-10-CM

## 2018-03-03 DIAGNOSIS — M32.19 DILATED CARDIOMYOPATHY SECONDARY TO SYSTEMIC LUPUS ERYTHEMATOSUS (HCC): Primary | ICD-10-CM

## 2018-03-03 LAB
ANION GAP SERPL CALCULATED.3IONS-SCNC: 6 MMOL/L (ref 4–13)
BASOPHILS # BLD AUTO: 0.01 THOUSANDS/ΜL (ref 0–0.1)
BASOPHILS NFR BLD AUTO: 0 % (ref 0–1)
BUN SERPL-MCNC: 18 MG/DL (ref 5–25)
CALCIUM SERPL-MCNC: 8.9 MG/DL (ref 8.3–10.1)
CHLORIDE SERPL-SCNC: 107 MMOL/L (ref 100–108)
CO2 SERPL-SCNC: 28 MMOL/L (ref 21–32)
CREAT SERPL-MCNC: 0.91 MG/DL (ref 0.6–1.3)
CRP SERPL QL: 4 MG/L
EOSINOPHIL # BLD AUTO: 0.05 THOUSAND/ΜL (ref 0–0.61)
EOSINOPHIL NFR BLD AUTO: 2 % (ref 0–6)
ERYTHROCYTE [DISTWIDTH] IN BLOOD BY AUTOMATED COUNT: 14.6 % (ref 11.6–15.1)
ERYTHROCYTE [SEDIMENTATION RATE] IN BLOOD: 13 MM/HOUR (ref 0–20)
GFR SERPL CREATININE-BSD FRML MDRD: 72 ML/MIN/1.73SQ M
GLUCOSE P FAST SERPL-MCNC: 80 MG/DL (ref 65–99)
HCT VFR BLD AUTO: 45.1 % (ref 34.8–46.1)
HGB BLD-MCNC: 14.7 G/DL (ref 11.5–15.4)
LYMPHOCYTES # BLD AUTO: 1.34 THOUSANDS/ΜL (ref 0.6–4.47)
LYMPHOCYTES NFR BLD AUTO: 41 % (ref 14–44)
MCH RBC QN AUTO: 28.4 PG (ref 26.8–34.3)
MCHC RBC AUTO-ENTMCNC: 32.6 G/DL (ref 31.4–37.4)
MCV RBC AUTO: 87 FL (ref 82–98)
MONOCYTES # BLD AUTO: 0.25 THOUSAND/ΜL (ref 0.17–1.22)
MONOCYTES NFR BLD AUTO: 8 % (ref 4–12)
NEUTROPHILS # BLD AUTO: 1.59 THOUSANDS/ΜL (ref 1.85–7.62)
NEUTS SEG NFR BLD AUTO: 49 % (ref 43–75)
NRBC BLD AUTO-RTO: 0 /100 WBCS
PLATELET # BLD AUTO: 180 THOUSANDS/UL (ref 149–390)
PMV BLD AUTO: 10.6 FL (ref 8.9–12.7)
POTASSIUM SERPL-SCNC: 3.9 MMOL/L (ref 3.5–5.3)
RBC # BLD AUTO: 5.18 MILLION/UL (ref 3.81–5.12)
SODIUM SERPL-SCNC: 141 MMOL/L (ref 136–145)
WBC # BLD AUTO: 3.25 THOUSAND/UL (ref 4.31–10.16)

## 2018-03-03 PROCEDURE — 86225 DNA ANTIBODY NATIVE: CPT

## 2018-03-03 PROCEDURE — 80048 BASIC METABOLIC PNL TOTAL CA: CPT

## 2018-03-03 PROCEDURE — 36415 COLL VENOUS BLD VENIPUNCTURE: CPT

## 2018-03-03 PROCEDURE — 85025 COMPLETE CBC W/AUTO DIFF WBC: CPT

## 2018-03-03 PROCEDURE — 86376 MICROSOMAL ANTIBODY EACH: CPT

## 2018-03-03 PROCEDURE — 85652 RBC SED RATE AUTOMATED: CPT

## 2018-03-03 PROCEDURE — 86800 THYROGLOBULIN ANTIBODY: CPT

## 2018-03-03 PROCEDURE — 86140 C-REACTIVE PROTEIN: CPT

## 2018-03-04 LAB — THYROPEROXIDASE AB SERPL-ACNC: 16 IU/ML (ref 0–34)

## 2018-03-05 LAB — DSDNA AB SER-ACNC: 14 IU/ML (ref 0–9)

## 2018-03-06 LAB — THYROGLOB AB SERPL-ACNC: <1 IU/ML (ref 0–0.9)

## 2018-03-23 ENCOUNTER — OFFICE VISIT (OUTPATIENT)
Dept: HEMATOLOGY ONCOLOGY | Facility: CLINIC | Age: 53
End: 2018-03-23
Payer: COMMERCIAL

## 2018-03-23 VITALS
OXYGEN SATURATION: 97 % | HEART RATE: 82 BPM | BODY MASS INDEX: 38.28 KG/M2 | TEMPERATURE: 96.5 F | WEIGHT: 195 LBS | HEIGHT: 60 IN | DIASTOLIC BLOOD PRESSURE: 80 MMHG | SYSTOLIC BLOOD PRESSURE: 120 MMHG | RESPIRATION RATE: 16 BRPM

## 2018-03-23 DIAGNOSIS — M32.9 SYSTEMIC LUPUS ERYTHEMATOSUS, UNSPECIFIED SLE TYPE, UNSPECIFIED ORGAN INVOLVEMENT STATUS (HCC): ICD-10-CM

## 2018-03-23 DIAGNOSIS — R91.1 INCIDENTAL LUNG NODULE, > 3MM AND < 8MM: ICD-10-CM

## 2018-03-23 DIAGNOSIS — R76.0 ANTICARDIOLIPIN ANTIBODY POSITIVE: Primary | ICD-10-CM

## 2018-03-23 DIAGNOSIS — R06.00 EXERTIONAL DYSPNEA: ICD-10-CM

## 2018-03-23 PROCEDURE — 99214 OFFICE O/P EST MOD 30 MIN: CPT | Performed by: INTERNAL MEDICINE

## 2018-03-23 NOTE — LETTER
March 23, 2018     Noe Bass MD  [address] [facility] [address]  [address] [address]    Patient: Nayely Deras   YOB: 1965   Date of Visit: 3/23/2018       Dear Dr Wing Dasilva:    Thank you for referring Nayely Deras to me for evaluation  Below are my notes for this consultation  If you have questions, please do not hesitate to call me  I look forward to following your patient along with you  Sincerely,        Greg Montoya MD        CC: No Recipients  Greg Montoya MD  3/23/2018  5:47 PM  Sign at close encounter    HPI:   Nayely Deras is a 47 y o  female with history of SLE and she follows with Dr Eleanor Keys for that  Previous history of  Low positive anticardiolipin antibody and that has become negative now  1 episode of for blood clot in her left leg below the knee when she was 11  She was hospitalized for that for 1 month but was not sent home on blood thinner      No blood clot since  She had 3 pregnancies and 2 live deliveries and there was no blood clot  She was not on blood thinner during pregnancies  She did not take birth control pills  She had hysterectomy for endometriosis 13 years ago and again there was no blood clot  No family history of blood clot  6 years ago she was diagnosed to have lupus and she has been on Plaquenil 200 mg twice a day and 81 mg aspirin daily  On routine blood work she was found to have anticardiolipin IgM antibody, 36  Negative lupus anticoagulant  Negative beta 2 glycoprotein antibody  MARCELINO titer 160    6 years ago she had bronchoscopy and biopsy of lung nodule and that was benign and there was no follow-up  Recent CT scan has shown is stable 5 mm lung nodule when compared to previous CT scan done in 2016  Has some tiredness  Has exertional dyspnea  No chest pain cough or hemoptysis  She used to follow with a lung specialist in [place] and that Dr  has retired  She is being referred to [place] [address] [facility]     Off and on swelling of ankles when she is on her feet too long  History of abnormal mammogram and she was getting mammography every 6 months but now she will be going for screening mammography in 1 year  Migraine headaches      Current Outpatient Prescriptions:     Ascorbic Acid (VITAMIN C) 500 MG CAPS, Take 500 mg by mouth, Disp: , Rfl:     aspirin 81 MG tablet, Take 1 tablet by mouth daily, Disp: , Rfl:     Cholecalciferol (VITAMIN D) 2000 units CAPS, Take 2,000 Units by mouth, Disp: , Rfl:     hydroxychloroquine (PLAQUENIL) 200 mg tablet, Take 1 tablet by mouth 2 (two) times a day, Disp: , Rfl:     SUMAtriptan (IMITREX) 100 mg tablet, Take 1 tablet (100 mg total) by mouth once as needed for migraine for up to 1 dose, Disp: 10 tablet, Rfl: 1    SUMAtriptan (IMITREX) 5 MG/ACT nasal spray, 1 spray (5 mg total) into each nostril 2 (two) times a day as needed for migraine, Disp: 1 Inhaler, Rfl: 0    No Known Allergies    ROS:  03/23/18 Reviewed 13 systems:  Presently no seizures, dizziness, diplopia, dysphagia, hoarseness, chest pain, palpitations,  cough, hemoptysis, abdominal pain, nausea, vomiting, change in bowel habits, melena, hematuria, fever, chills, bleeding, bone pains, skin rash, weight loss, arthritic symptoms, numbness,  weakness, claudication and gait problem  No frequent infections  No hot flashes  Not unusually sensitive to heat or cold  No swollen glands  Patient is anxious     No GYN symptoms   Other symptoms are in HPI        /80   Pulse 82   Temp (!) 96 5 °F (35 8 °C) (Tympanic)   Resp 16   Ht 5' (1 524 m)   Wt 88 5 kg (195 lb)   SpO2 97%   BMI 38 08 kg/m²      Physical Exam:  Alert, oriented, not in distress, no icterus, no oral thrush, no palpable neck mass, clear lung fields, regular heart rate, abdomen  soft and non tender, no palpable abdominal mass, no ascites, no edema of ankles, no calf tenderness, no focal neurological deficit, no skin rash, no palpable lymphadenopathy, good arterial pulses, no clubbing  Patient is anxious  Performance status 1  IMAGING:  No orders to display       LABS:  Results for orders placed or performed in visit on 03/03/18   CBC and differential   Result Value Ref Range    WBC 3 25 (L) 4 31 - 10 16 Thousand/uL    RBC 5 18 (H) 3 81 - 5 12 Million/uL    Hemoglobin 14 7 11 5 - 15 4 g/dL    Hematocrit 45 1 34 8 - 46 1 %    MCV 87 82 - 98 fL    MCH 28 4 26 8 - 34 3 pg    MCHC 32 6 31 4 - 37 4 g/dL    RDW 14 6 11 6 - 15 1 %    MPV 10 6 8 9 - 12 7 fL    Platelets 564 839 - 609 Thousands/uL    nRBC 0 /100 WBCs    Neutrophils Relative 49 43 - 75 %    Lymphocytes Relative 41 14 - 44 %    Monocytes Relative 8 4 - 12 %    Eosinophils Relative 2 0 - 6 %    Basophils Relative 0 0 - 1 %    Neutrophils Absolute 1 59 (L) 1 85 - 7 62 Thousands/µL    Lymphocytes Absolute 1 34 0 60 - 4 47 Thousands/µL    Monocytes Absolute 0 25 0 17 - 1 22 Thousand/µL    Eosinophils Absolute 0 05 0 00 - 0 61 Thousand/µL    Basophils Absolute 0 01 0 00 - 0 10 Thousands/µL   Sedimentation rate, automated   Result Value Ref Range    Sed Rate 13 0 - 20 mm/hour   C-reactive protein   Result Value Ref Range    CRP 4 0 (H) <3 0 mg/L   Basic metabolic panel   Result Value Ref Range    Sodium 141 136 - 145 mmol/L    Potassium 3 9 3 5 - 5 3 mmol/L    Chloride 107 100 - 108 mmol/L    CO2 28 21 - 32 mmol/L    Anion Gap 6 4 - 13 mmol/L    BUN 18 5 - 25 mg/dL    Creatinine 0 91 0 60 - 1 30 mg/dL    Glucose, Fasting 80 65 - 99 mg/dL    Calcium 8 9 8 3 - 10 1 mg/dL    eGFR 72 ml/min/1 73sq m   Anti-DNA antibody, double-stranded   Result Value Ref Range    ds DNA Ab 14 (H) 0 - 9 IU/mL   Anti-microsomal antibody   Result Value Ref Range    THYROID MICROSOMAL ANTIBODY 16 0 - 34 IU/mL   Anti-thyroglobulin antibody   Result Value Ref Range    Thyroglobulin Ab <1 0 0 0 - 0 9 IU/mL         Reviewed and discussed with patient      Assessment and plan:  Fernando Oropeza is a 47 y o  female with history of SLE and she follows with   Damien Armstrong for that  Previous history of  Low positive anticardiolipin antibody and that has become negative now  1 episode of for blood clot in her left leg below the knee when she was 11  She was hospitalized for that for 1 month but was not sent home on blood thinner      No blood clot since  She had 3 pregnancies and 2 live deliveries and there was no blood clot  She was not on blood thinner during pregnancies  She did not take birth control pills  She had hysterectomy for endometriosis 13 years ago and again there was no blood clot  No family history of blood clot  6 years ago she was diagnosed to have lupus and she has been on Plaquenil 200 mg twice a day and 81 mg aspirin daily  On routine blood work she was found to have anticardiolipin IgM antibody, 36  Negative lupus anticoagulant  Negative beta 2 glycoprotein antibody  MARCELINO titer 160    6 years ago she had bronchoscopy and biopsy of lung nodule and that was benign and there was no follow-up  Recent CT scan has shown is stable 5 mm lung nodule when compared to previous CT scan done in 2016  Has some tiredness  Has exertional dyspnea  No chest pain cough or hemoptysis  She used to follow with a lung specialist in [place] and that Dr  has retired  She is being referred to [facility] [address] [facility]  Off and on swelling of ankles when she is on her feet too long  History of abnormal mammogram and she was getting mammography every 6 months but now she will be going for screening mammography in 1 year  Migraine headaches    Physical examination and test results are as recorded and discussed  This time anticardiolipin antibody test result has come back negative and will be repeated later on  She is being referred to Pulmonary service for shortness of breath  Lupus is being managed by her rheumatologist   No indication to anticoagulate at this time  Condition and plan discussed  Questions answered    Also discussed the importance of self-breast examination, eating healthy foods, staying active and health screening tests  1  Anticardiolipin antibody positive    - Cardiolipin antibody; Future  - CBC and differential; Future  - Comprehensive metabolic panel; Future    2  Incidental lung nodule, > 3mm and < 8mm      3  Systemic lupus erythematosus, unspecified SLE type, unspecified organ involvement status ([facility] [address] )    - Ambulatory referral to Pulmonology; Future    4  Exertional dyspnea    - Ambulatory referral to Pulmonology; Future        Patient voiced understanding and agreement in the discussion  Counseling / Coordination of Care   Greater than 50% of total time was spent with the patient and / or family counseling and / or coordination of care

## 2018-03-23 NOTE — PROGRESS NOTES
HPI:   Melodi Dancer is a 47 y o  female with history of SLE and she follows with Dr Vickey Castillo for that  Previous history of  Low positive anticardiolipin antibody and that has become negative now  1 episode of for blood clot in her left leg below the knee when she was 11  She was hospitalized for that for 1 month but was not sent home on blood thinner      No blood clot since  She had 3 pregnancies and 2 live deliveries and there was no blood clot  She was not on blood thinner during pregnancies  She did not take birth control pills  She had hysterectomy for endometriosis 13 years ago and again there was no blood clot  No family history of blood clot  6 years ago she was diagnosed to have lupus and she has been on Plaquenil 200 mg twice a day and 81 mg aspirin daily  On routine blood work she was found to have anticardiolipin IgM antibody, 36  Negative lupus anticoagulant  Negative beta 2 glycoprotein antibody  MARCELINO titer 160    6 years ago she had bronchoscopy and biopsy of lung nodule and that was benign and there was no follow-up  Recent CT scan has shown is stable 5 mm lung nodule when compared to previous CT scan done in 2016  Has some tiredness  Has exertional dyspnea  No chest pain cough or hemoptysis  She used to follow with a lung specialist in [place] and that Dr  has retired  She is being referred to [facility] [address] [facility]  Off and on swelling of ankles when she is on her feet too long  History of abnormal mammogram and she was getting mammography every 6 months but now she will be going for screening mammography in 1 year   Migraine headaches      Current Outpatient Prescriptions:     Ascorbic Acid (VITAMIN C) 500 MG CAPS, Take 500 mg by mouth, Disp: , Rfl:     aspirin 81 MG tablet, Take 1 tablet by mouth daily, Disp: , Rfl:     Cholecalciferol (VITAMIN D) 2000 units CAPS, Take 2,000 Units by mouth, Disp: , Rfl:     hydroxychloroquine (PLAQUENIL) 200 mg tablet, Take 1 tablet by mouth 2 (two) times a day, Disp: , Rfl:     SUMAtriptan (IMITREX) 100 mg tablet, Take 1 tablet (100 mg total) by mouth once as needed for migraine for up to 1 dose, Disp: 10 tablet, Rfl: 1    SUMAtriptan (IMITREX) 5 MG/ACT nasal spray, 1 spray (5 mg total) into each nostril 2 (two) times a day as needed for migraine, Disp: 1 Inhaler, Rfl: 0    No Known Allergies    ROS:  03/23/18 Reviewed 13 systems:  Presently no seizures, dizziness, diplopia, dysphagia, hoarseness, chest pain, palpitations,  cough, hemoptysis, abdominal pain, nausea, vomiting, change in bowel habits, melena, hematuria, fever, chills, bleeding, bone pains, skin rash, weight loss, arthritic symptoms, numbness,  weakness, claudication and gait problem  No frequent infections  No hot flashes  Not unusually sensitive to heat or cold  No swollen glands  Patient is anxious     No GYN symptoms  Other symptoms are in HPI        /80   Pulse 82   Temp (!) 96 5 °F (35 8 °C) (Tympanic)   Resp 16   Ht 5' (1 524 m)   Wt 88 5 kg (195 lb)   SpO2 97%   BMI 38 08 kg/m²     Physical Exam:  Alert, oriented, not in distress, no icterus, no oral thrush, no palpable neck mass, clear lung fields, regular heart rate, abdomen  soft and non tender, no palpable abdominal mass, no ascites, no edema of ankles, no calf tenderness, no focal neurological deficit, no skin rash, no palpable lymphadenopathy, good arterial pulses, no clubbing  Patient is anxious  Performance status 1      IMAGING:  No orders to display       LABS:  Results for orders placed or performed in visit on 03/03/18   CBC and differential   Result Value Ref Range    WBC 3 25 (L) 4 31 - 10 16 Thousand/uL    RBC 5 18 (H) 3 81 - 5 12 Million/uL    Hemoglobin 14 7 11 5 - 15 4 g/dL    Hematocrit 45 1 34 8 - 46 1 %    MCV 87 82 - 98 fL    MCH 28 4 26 8 - 34 3 pg    MCHC 32 6 31 4 - 37 4 g/dL    RDW 14 6 11 6 - 15 1 %    MPV 10 6 8 9 - 12 7 fL    Platelets 313 144 - 955 Thousands/uL    nRBC 0 /100 WBCs Neutrophils Relative 49 43 - 75 %    Lymphocytes Relative 41 14 - 44 %    Monocytes Relative 8 4 - 12 %    Eosinophils Relative 2 0 - 6 %    Basophils Relative 0 0 - 1 %    Neutrophils Absolute 1 59 (L) 1 85 - 7 62 Thousands/µL    Lymphocytes Absolute 1 34 0 60 - 4 47 Thousands/µL    Monocytes Absolute 0 25 0 17 - 1 22 Thousand/µL    Eosinophils Absolute 0 05 0 00 - 0 61 Thousand/µL    Basophils Absolute 0 01 0 00 - 0 10 Thousands/µL   Sedimentation rate, automated   Result Value Ref Range    Sed Rate 13 0 - 20 mm/hour   C-reactive protein   Result Value Ref Range    CRP 4 0 (H) <3 0 mg/L   Basic metabolic panel   Result Value Ref Range    Sodium 141 136 - 145 mmol/L    Potassium 3 9 3 5 - 5 3 mmol/L    Chloride 107 100 - 108 mmol/L    CO2 28 21 - 32 mmol/L    Anion Gap 6 4 - 13 mmol/L    BUN 18 5 - 25 mg/dL    Creatinine 0 91 0 60 - 1 30 mg/dL    Glucose, Fasting 80 65 - 99 mg/dL    Calcium 8 9 8 3 - 10 1 mg/dL    eGFR 72 ml/min/1 73sq m   Anti-DNA antibody, double-stranded   Result Value Ref Range    ds DNA Ab 14 (H) 0 - 9 IU/mL   Anti-microsomal antibody   Result Value Ref Range    THYROID MICROSOMAL ANTIBODY 16 0 - 34 IU/mL   Anti-thyroglobulin antibody   Result Value Ref Range    Thyroglobulin Ab <1 0 0 0 - 0 9 IU/mL         Reviewed and discussed with patient  Assessment and plan:  Olivia Ríos is a 47 y o  female with history of SLE and she follows with Dr Preeti Guardado for that  Previous history of  Low positive anticardiolipin antibody and that has become negative now  1 episode of for blood clot in her left leg below the knee when she was 11  She was hospitalized for that for 1 month but was not sent home on blood thinner      No blood clot since  She had 3 pregnancies and 2 live deliveries and there was no blood clot  She was not on blood thinner during pregnancies  She did not take birth control pills  She had hysterectomy for endometriosis 13 years ago and again there was no blood clot   No family history of blood clot  6 years ago she was diagnosed to have lupus and she has been on Plaquenil 200 mg twice a day and 81 mg aspirin daily  On routine blood work she was found to have anticardiolipin IgM antibody, 36  Negative lupus anticoagulant  Negative beta 2 glycoprotein antibody  MARCELINO titer 160    6 years ago she had bronchoscopy and biopsy of lung nodule and that was benign and there was no follow-up  Recent CT scan has shown is stable 5 mm lung nodule when compared to previous CT scan done in 2016  Has some tiredness  Has exertional dyspnea  No chest pain cough or hemoptysis  She used to follow with a lung specialist in [place] and that Dr  has retired  She is being referred to [facility] [address] [facility]  Off and on swelling of ankles when she is on her feet too long  History of abnormal mammogram and she was getting mammography every 6 months but now she will be going for screening mammography in 1 year  Migraine headaches    Physical examination and test results are as recorded and discussed  This time anticardiolipin antibody test result has come back negative and will be repeated later on  She is being referred to Pulmonary service for shortness of breath  Lupus is being managed by her rheumatologist   No indication to anticoagulate at this time  Condition and plan discussed  Questions answered  Also discussed the importance of self-breast examination, eating healthy foods, staying active and health screening tests  1  Anticardiolipin antibody positive    - Cardiolipin antibody; Future  - CBC and differential; Future  - Comprehensive metabolic panel; Future    2  Incidental lung nodule, > 3mm and < 8mm      3  Systemic lupus erythematosus, unspecified SLE type, unspecified organ involvement status ([facility] [address] )    - Ambulatory referral to Pulmonology; Future    4  Exertional dyspnea    - Ambulatory referral to Pulmonology;  Future        Patient voiced understanding and agreement in the discussion  Counseling / Coordination of Care   Greater than 50% of total time was spent with the patient and / or family counseling and / or coordination of care

## 2018-03-23 NOTE — PATIENT INSTRUCTIONS
Blood tests in 3 months  Visit in 3-1/2 months  Referral to Pulmonary for exertional dyspnea  If condition worsened please go to the emergency room

## 2018-04-12 ENCOUNTER — TELEPHONE (OUTPATIENT)
Dept: FAMILY MEDICINE CLINIC | Facility: CLINIC | Age: 53
End: 2018-04-12

## 2018-04-12 DIAGNOSIS — Z12.31 VISIT FOR SCREENING MAMMOGRAM: Primary | ICD-10-CM

## 2018-04-16 ENCOUNTER — APPOINTMENT (OUTPATIENT)
Dept: RADIOLOGY | Age: 53
End: 2018-04-16
Payer: COMMERCIAL

## 2018-04-16 ENCOUNTER — TRANSCRIBE ORDERS (OUTPATIENT)
Dept: ADMINISTRATIVE | Age: 53
End: 2018-04-16

## 2018-04-16 ENCOUNTER — HOSPITAL ENCOUNTER (OUTPATIENT)
Dept: RADIOLOGY | Facility: HOSPITAL | Age: 53
Discharge: HOME | End: 2018-04-16

## 2018-04-16 DIAGNOSIS — M32.12 DRUG-INDUCED SYSTEMIC LUPUS ERYTHEMATOSUS WITH PERICARDITIS (HCC): ICD-10-CM

## 2018-04-16 DIAGNOSIS — I20.0 UNSTABLE ANGINA PECTORIS (HCC): ICD-10-CM

## 2018-04-16 DIAGNOSIS — M32.0 DRUG-INDUCED SYSTEMIC LUPUS ERYTHEMATOSUS WITH PERICARDITIS (HCC): ICD-10-CM

## 2018-04-16 DIAGNOSIS — I20.0 UNSTABLE ANGINA PECTORIS (HCC): Primary | ICD-10-CM

## 2018-04-16 PROCEDURE — 71046 X-RAY EXAM CHEST 2 VIEWS: CPT

## 2018-04-27 RX ORDER — SODIUM CHLORIDE 9 MG/ML
20 INJECTION, SOLUTION INTRAVENOUS ONCE
Status: DISCONTINUED | OUTPATIENT
Start: 2018-04-30 | End: 2018-05-03 | Stop reason: HOSPADM

## 2018-04-27 RX ORDER — ACETAMINOPHEN 325 MG/1
650 TABLET ORAL ONCE
Status: DISCONTINUED | OUTPATIENT
Start: 2018-04-30 | End: 2018-05-03 | Stop reason: HOSPADM

## 2018-04-27 RX ORDER — DIPHENHYDRAMINE HCL 25 MG
25 TABLET ORAL ONCE
Status: DISCONTINUED | OUTPATIENT
Start: 2018-04-30 | End: 2018-05-03 | Stop reason: HOSPADM

## 2018-04-30 ENCOUNTER — HOSPITAL ENCOUNTER (OUTPATIENT)
Dept: INFUSION CENTER | Facility: HOSPITAL | Age: 53
Discharge: HOME | End: 2018-04-30

## 2018-05-03 ENCOUNTER — TELEPHONE (OUTPATIENT)
Dept: PULMONOLOGY | Facility: CLINIC | Age: 53
End: 2018-05-03

## 2018-05-03 NOTE — TELEPHONE ENCOUNTER
Per Luciano Walls at Dr Arnol Persaud office- she had called the back line two weeks ago requesting an apt  She was told she was going to get a call back after checking with doctor but she never did  She does not know who she spoke with

## 2018-05-21 DIAGNOSIS — R51.9 ACUTE INTRACTABLE HEADACHE, UNSPECIFIED HEADACHE TYPE: Primary | ICD-10-CM

## 2018-05-21 RX ORDER — SUMATRIPTAN 5 MG/1
1 SPRAY NASAL 2 TIMES DAILY PRN
Qty: 1 INHALER | Refills: 0 | Status: SHIPPED | OUTPATIENT
Start: 2018-05-21

## 2018-05-31 ENCOUNTER — TELEPHONE (OUTPATIENT)
Dept: FAMILY MEDICINE CLINIC | Facility: CLINIC | Age: 53
End: 2018-05-31

## 2018-05-31 NOTE — TELEPHONE ENCOUNTER
Patient is requesting to see you and only you but can only make it on Thursdays  Could we try to squeeze her in next thursday (06/07/2018) anywhere? Patient prefers evenings  Patient has a rash top of belly button, started a couple days ago (5/29/18)  Let me know what you think

## 2018-06-07 PROBLEM — D72.819 LEUKOPENIA: Status: ACTIVE | Noted: 2017-12-15

## 2018-06-07 PROBLEM — K64.4 HEMORRHOIDS, EXTERNAL: Status: ACTIVE | Noted: 2017-02-24

## 2018-06-07 PROBLEM — M75.01 ADHESIVE CAPSULITIS OF RIGHT SHOULDER: Status: ACTIVE | Noted: 2017-10-23

## 2018-06-07 PROBLEM — I87.2 CHRONIC VENOUS INSUFFICIENCY: Status: ACTIVE | Noted: 2017-12-01

## 2018-08-16 ENCOUNTER — OFFICE VISIT (OUTPATIENT)
Dept: FAMILY MEDICINE CLINIC | Facility: CLINIC | Age: 53
End: 2018-08-16
Payer: COMMERCIAL

## 2018-08-16 VITALS
TEMPERATURE: 97 F | BODY MASS INDEX: 38.09 KG/M2 | RESPIRATION RATE: 18 BRPM | OXYGEN SATURATION: 98 % | DIASTOLIC BLOOD PRESSURE: 68 MMHG | HEART RATE: 89 BPM | HEIGHT: 60 IN | SYSTOLIC BLOOD PRESSURE: 104 MMHG | WEIGHT: 194 LBS

## 2018-08-16 DIAGNOSIS — G43.709 CHRONIC MIGRAINE WITHOUT AURA WITHOUT STATUS MIGRAINOSUS, NOT INTRACTABLE: Primary | ICD-10-CM

## 2018-08-16 DIAGNOSIS — R60.0 LOCALIZED EDEMA: ICD-10-CM

## 2018-08-16 DIAGNOSIS — R21 RASH AND NONSPECIFIC SKIN ERUPTION: ICD-10-CM

## 2018-08-16 PROCEDURE — 3008F BODY MASS INDEX DOCD: CPT | Performed by: FAMILY MEDICINE

## 2018-08-16 PROCEDURE — 99214 OFFICE O/P EST MOD 30 MIN: CPT | Performed by: FAMILY MEDICINE

## 2018-08-16 RX ORDER — BUTALBITAL, ACETAMINOPHEN AND CAFFEINE 50; 325; 40 MG/1; MG/1; MG/1
1 TABLET ORAL EVERY 4 HOURS PRN
Qty: 30 TABLET | Refills: 0 | Status: SHIPPED | OUTPATIENT
Start: 2018-08-16 | End: 2018-09-06 | Stop reason: SDUPTHER

## 2018-08-16 RX ORDER — HYDROCHLOROTHIAZIDE 12.5 MG/1
12.5 CAPSULE, GELATIN COATED ORAL DAILY
Qty: 90 CAPSULE | Refills: 0 | Status: SHIPPED | OUTPATIENT
Start: 2018-08-16 | End: 2018-12-10 | Stop reason: SDUPTHER

## 2018-08-16 RX ORDER — CLOBETASOL PROPIONATE 0.5 MG/G
CREAM TOPICAL 2 TIMES DAILY
Qty: 30 G | Refills: 0 | Status: SHIPPED | OUTPATIENT
Start: 2018-08-16 | End: 2018-10-25 | Stop reason: ALTCHOICE

## 2018-08-16 RX ORDER — UREA 10 %
500 LOTION (ML) TOPICAL 2 TIMES DAILY
Qty: 60 TABLET | Refills: 3 | Status: SHIPPED | OUTPATIENT
Start: 2018-08-16

## 2018-08-16 NOTE — PROGRESS NOTES
Assessment/Plan:         Diagnoses and all orders for this visit:    Chronic migraine without aura without status migrainosus, not intractable  -     butalbital-acetaminophen-caffeine (FIORICET,ESGIC) -40 mg per tablet; Take 1 tablet by mouth every 4 (four) hours as needed for headaches  -     magnesium gluconate (MAGONATE) 500 mg tablet; Take 1 tablet (500 mg total) by mouth 2 (two) times a day    Localized edema  -     hydrochlorothiazide (MICROZIDE) 12 5 mg capsule; Take 1 capsule (12 5 mg total) by mouth daily    Rash and nonspecific skin eruption  -     clobetasol (TEMOVATE) 0 05 % cream; Apply topically 2 (two) times a day      1) migraines: magnesium twice a day   fiorcet PRN   2) edema: HCTZ as directed  3) Rash: dermatitis- Clobetasol PRN and lotion daily     Subjective:      Patient ID: Amrita Barrow is a 47 y o  female  Edema  Patient complains of edema in both lower legs  The edema has been mild  Onset of symptoms was several years ago, and patient reports symptoms have completely resolved since that time  The patient states the problem is long-standing  The swelling has been aggravated by dependency of involved area  The swelling has been relieved by diuretics  Associated factors include: nothing  Cardiac risk factors include none  Migraine    This is a recurrent problem  The current episode started in the past 7 days  The problem occurs daily  The problem has been gradually worsening  The pain is located in the left unilateral region  The pain does not radiate  The pain quality is similar to prior headaches  The pain is at a severity of 5/10  The pain is mild   Pertinent negatives include no abdominal pain, abnormal behavior, anorexia, back pain, blurred vision, coughing, dizziness, drainage, ear pain, eye pain, eye redness, eye watering, facial sweating, fever, hearing loss, insomnia, loss of balance, muscle aches, nausea, neck pain, numbness, phonophobia, photophobia, rhinorrhea, scalp tenderness, seizures, sinus pressure, sore throat, swollen glands, tingling, tinnitus, visual change, vomiting, weakness or weight loss  The symptoms are aggravated by bright light  She has tried nothing for the symptoms  The treatment provided mild relief  Her past medical history is significant for migraine headaches  There is no history of cancer, cluster headaches, hypertension, immunosuppression, migraines in the family, obesity, pseudotumor cerebri, recent head traumas, sinus disease or TMJ  Rash   This is a new problem  The current episode started in the past 7 days  Location: legs and abdomen  Pertinent negatives include no anorexia, congestion, cough, eye pain, facial edema, fatigue, fever, joint pain, nail changes, rhinorrhea, shortness of breath, sore throat or vomiting  Past treatments include nothing  The treatment provided mild relief  There is no history of allergies, asthma, eczema or varicella  The following portions of the patient's history were reviewed and updated as appropriate: allergies, current medications, past family history, past medical history, past social history, past surgical history and problem list     Review of Systems   Constitutional: Negative for fatigue, fever and weight loss  HENT: Negative for congestion, ear pain, hearing loss, rhinorrhea, sinus pressure, sore throat and tinnitus  Eyes: Negative for blurred vision, photophobia, pain and redness  Respiratory: Negative for cough and shortness of breath  Gastrointestinal: Negative for abdominal pain, anorexia, nausea and vomiting  Musculoskeletal: Negative for back pain, joint pain and neck pain  Skin: Positive for rash  Negative for nail changes  Neurological: Negative for dizziness, tingling, seizures, weakness, numbness and loss of balance  Psychiatric/Behavioral: The patient does not have insomnia                Past Medical History:   Diagnosis Date    Arthralgia of multiple joints     Last Assessed:4/15/16    Hypertension     Left leg DVT (HCC)     Last Assessd:12/1/17    Lupus     Migraine     Systemic lupus erythematosus ([facility] [address] )     SLE; Last Assessed:4/15/16     Past Surgical History:   Procedure Laterality Date    HYSTERECTOMY       Social History     Social History    Marital status:      Spouse name: N/A    Number of children: N/A    Years of education: N/A     Occupational History    [employer]      Social History Main Topics    Smoking status: Never Smoker    Smokeless tobacco: Never Used    Alcohol use No    Drug use: No    Sexual activity: Not on file     Other Topics Concern    Not on file     Social History Narrative    Caffeine use    Completed 12th grade             No Known Allergies      Family History   Problem Relation Age of Onset   [place] Hearing loss Mother     COPD Mother     Hypertension Mother         Essential    Heart attack Mother         MI    Diabetes Father     Hypertension Father     Cancer Father     Prostate cancer Father     Hyperlipidemia Father     Rheum arthritis Sister     Diabetes Brother     COPD Family     Lupus Other         SLE           Current Outpatient Prescriptions:     hydroxychloroquine (PLAQUENIL) 200 mg tablet, Take 1 tablet by mouth 2 (two) times a day, Disp: , Rfl:     Ascorbic Acid (VITAMIN C) 500 MG CAPS, Take 500 mg by mouth, Disp: , Rfl:     aspirin 81 MG tablet, Take 1 tablet by mouth daily, Disp: , Rfl:     butalbital-acetaminophen-caffeine (FIORICET,ESGIC) -40 mg per tablet, Take 1 tablet by mouth every 4 (four) hours as needed for headaches, Disp: 30 tablet, Rfl: 0    Cholecalciferol (VITAMIN D) 2000 units CAPS, Take 2,000 Units by mouth, Disp: , Rfl:     clobetasol (TEMOVATE) 0 05 % cream, Apply topically 2 (two) times a day, Disp: 30 g, Rfl: 0    hydrochlorothiazide (MICROZIDE) 12 5 mg capsule, Take 1 capsule (12 5 mg total) by mouth daily, Disp: 90 capsule, Rfl: 0    magnesium gluconate (MAGONATE) 500 mg tablet, Take 1 tablet (500 mg total) by mouth 2 (two) times a day, Disp: 60 tablet, Rfl: 3    SUMAtriptan (IMITREX) 100 mg tablet, Take 1 tablet (100 mg total) by mouth once as needed for migraine for up to 1 dose (Patient not taking: Reported on 8/16/2018 ), Disp: 10 tablet, Rfl: 1    SUMAtriptan (IMITREX) 5 MG/ACT nasal spray, 1 spray (5 mg total) into each nostril 2 (two) times a day as needed for migraine (Patient not taking: Reported on 8/16/2018 ), Disp: 1 Inhaler, Rfl: 0    Objective:      /68 (BP Location: Left arm, Patient Position: Sitting, Cuff Size: Standard)   Pulse 89   Temp (!) 97 °F (36 1 °C)   Resp 18   Ht 5' (1 524 m)   Wt 88 kg (194 lb)   SpO2 98%   BMI 37 89 kg/m²          Physical Exam   Constitutional: She is oriented to person, place, and time  She appears well-developed and well-nourished  HENT:   Head: Normocephalic and atraumatic  Mouth/Throat: No oropharyngeal exudate  Eyes: EOM are normal  Pupils are equal, round, and reactive to light  Neck: Normal range of motion  Neck supple  Cardiovascular: Normal rate and regular rhythm  Pulmonary/Chest: Effort normal and breath sounds normal  No respiratory distress  She has no wheezes  Neurological: She is alert and oriented to person, place, and time  Skin: Skin is warm  Rash noted  There is erythema  Right leg and abdomen    Psychiatric: She has a normal mood and affect   Her behavior is normal

## 2018-08-27 ENCOUNTER — HOSPITAL ENCOUNTER (OUTPATIENT)
Dept: MAMMOGRAPHY | Facility: CLINIC | Age: 53
Discharge: HOME | End: 2018-08-27
Payer: COMMERCIAL

## 2018-08-27 ENCOUNTER — HOSPITAL ENCOUNTER (OUTPATIENT)
Dept: ULTRASOUND IMAGING | Facility: CLINIC | Age: 53
Discharge: HOME | End: 2018-08-27

## 2018-08-27 DIAGNOSIS — Z12.31 VISIT FOR SCREENING MAMMOGRAM: ICD-10-CM

## 2018-08-27 DIAGNOSIS — R92.8 OTHER ABNORMAL AND INCONCLUSIVE FINDINGS ON DIAGNOSTIC IMAGING OF BREAST: ICD-10-CM

## 2018-08-27 PROCEDURE — 77067 SCR MAMMO BI INCL CAD: CPT

## 2018-08-27 PROCEDURE — 76642 ULTRASOUND BREAST LIMITED: CPT

## 2018-09-06 DIAGNOSIS — G43.709 CHRONIC MIGRAINE WITHOUT AURA WITHOUT STATUS MIGRAINOSUS, NOT INTRACTABLE: ICD-10-CM

## 2018-09-06 RX ORDER — BUTALBITAL, ACETAMINOPHEN AND CAFFEINE 50; 325; 40 MG/1; MG/1; MG/1
1 TABLET ORAL EVERY 4 HOURS PRN
Qty: 30 TABLET | Refills: 0 | Status: SHIPPED | OUTPATIENT
Start: 2018-09-06 | End: 2018-11-09 | Stop reason: SDUPTHER

## 2018-10-24 RX ORDER — DIPHENHYDRAMINE HCL 25 MG
25 TABLET ORAL ONCE
Status: COMPLETED | OUTPATIENT
Start: 2018-10-25 | End: 2018-10-25

## 2018-10-24 RX ORDER — SODIUM CHLORIDE 9 MG/ML
20 INJECTION, SOLUTION INTRAVENOUS ONCE
Status: COMPLETED | OUTPATIENT
Start: 2018-10-25 | End: 2018-10-25

## 2018-10-24 RX ORDER — ACETAMINOPHEN 325 MG/1
650 TABLET ORAL ONCE
Status: COMPLETED | OUTPATIENT
Start: 2018-10-25 | End: 2018-10-25

## 2018-10-25 ENCOUNTER — HOSPITAL ENCOUNTER (OUTPATIENT)
Dept: INFUSION CENTER | Facility: HOSPITAL | Age: 53
Discharge: HOME | End: 2018-10-25
Payer: COMMERCIAL

## 2018-10-25 VITALS
HEIGHT: 60 IN | DIASTOLIC BLOOD PRESSURE: 71 MMHG | TEMPERATURE: 97.7 F | BODY MASS INDEX: 39.04 KG/M2 | SYSTOLIC BLOOD PRESSURE: 125 MMHG | RESPIRATION RATE: 16 BRPM | HEART RATE: 69 BPM | WEIGHT: 198.85 LBS

## 2018-10-25 PROCEDURE — 96413 CHEMO IV INFUSION 1 HR: CPT

## 2018-10-25 RX ADMIN — ACETAMINOPHEN 650 MG: 325 TABLET, FILM COATED ORAL at 11:10

## 2018-10-25 RX ADMIN — BELIMUMAB 900 MG: 400 INJECTION, POWDER, LYOPHILIZED, FOR SOLUTION INTRAVENOUS at 11:53

## 2018-10-25 RX ADMIN — DIPHENHYDRAMINE HCL 25 MG: 25 TABLET ORAL at 11:10

## 2018-10-25 RX ADMIN — SODIUM CHLORIDE 20 ML/HR: 0.9 INJECTION, SOLUTION INTRAVENOUS at 11:10

## 2018-10-25 NOTE — PLAN OF CARE
Problem: Potential for Falls  Goal: Patient will remain free of falls  INTERVENTIONS:  - Assess patient frequently for physical needs  -  Identify cognitive and physical deficits and behaviors that affect risk of falls    -  [place] fall precautions as indicated by assessment   - Educate patient/family on patient safety including physical limitations  - Instruct patient to call for assistance with activity based on assessment  - Modify environment to reduce risk of injury  - Consider OT/PT consult to assist with strengthening/mobility   Outcome: Progressing

## 2018-11-07 RX ORDER — DIPHENHYDRAMINE HCL 25 MG
25 TABLET ORAL ONCE
Status: DISCONTINUED | OUTPATIENT
Start: 2018-11-08 | End: 2018-11-08

## 2018-11-07 RX ORDER — SODIUM CHLORIDE 9 MG/ML
20 INJECTION, SOLUTION INTRAVENOUS ONCE
Status: DISCONTINUED | OUTPATIENT
Start: 2018-11-08 | End: 2018-11-08

## 2018-11-07 RX ORDER — ACETAMINOPHEN 325 MG/1
650 TABLET ORAL ONCE
Status: DISCONTINUED | OUTPATIENT
Start: 2018-11-08 | End: 2018-11-08

## 2018-11-08 ENCOUNTER — HOSPITAL ENCOUNTER (OUTPATIENT)
Dept: INFUSION CENTER | Facility: HOSPITAL | Age: 53
Discharge: HOME | End: 2018-11-08

## 2018-11-09 DIAGNOSIS — G43.709 CHRONIC MIGRAINE WITHOUT AURA WITHOUT STATUS MIGRAINOSUS, NOT INTRACTABLE: ICD-10-CM

## 2018-11-09 RX ORDER — BUTALBITAL, ACETAMINOPHEN AND CAFFEINE 50; 325; 40 MG/1; MG/1; MG/1
1 TABLET ORAL EVERY 4 HOURS PRN
Qty: 30 TABLET | Refills: 0 | Status: SHIPPED | OUTPATIENT
Start: 2018-11-09

## 2018-12-10 DIAGNOSIS — R60.0 LOCALIZED EDEMA: ICD-10-CM

## 2018-12-10 RX ORDER — HYDROCHLOROTHIAZIDE 12.5 MG/1
CAPSULE, GELATIN COATED ORAL
Qty: 90 CAPSULE | Refills: 0 | Status: SHIPPED | OUTPATIENT
Start: 2018-12-10 | End: 2019-03-18 | Stop reason: SDUPTHER

## 2018-12-22 ENCOUNTER — APPOINTMENT (EMERGENCY)
Dept: RADIOLOGY | Facility: HOSPITAL | Age: 53
End: 2018-12-22
Payer: COMMERCIAL

## 2018-12-22 ENCOUNTER — HOSPITAL ENCOUNTER (OUTPATIENT)
Facility: HOSPITAL | Age: 53
Setting detail: OBSERVATION
Discharge: HOME | End: 2018-12-24
Attending: SURGERY | Admitting: SURGERY
Payer: COMMERCIAL

## 2018-12-22 DIAGNOSIS — S80.10XA CONTUSION OF MULTIPLE SITES OF LOWER EXTREMITY, UNSPECIFIED LATERALITY, INITIAL ENCOUNTER: Primary | ICD-10-CM

## 2018-12-22 LAB
BASE EXCESS BLDA CALC-SCNC: 1 MMOL/L (ref -2–3)
CA-I BLD-SCNC: 1.19 MMOL/L (ref 1.12–1.32)
CK MB SERPL-MCNC: 1.4 NG/ML (ref 0–5)
CK MB SERPL-MCNC: <1 % (ref 0–2.5)
CK SERPL-CCNC: 155 U/L (ref 26–192)
GLUCOSE SERPL-MCNC: 126 MG/DL (ref 65–140)
HCO3 BLDA-SCNC: 25.9 MMOL/L (ref 24–30)
HCT VFR BLD CALC: 40 % (ref 34.8–46.1)
HGB BLDA-MCNC: 13.6 G/DL (ref 11.5–15.4)
PCO2 BLD: 27 MMOL/L (ref 21–32)
PCO2 BLD: 41.7 MM HG (ref 42–50)
PH BLD: 7.4 [PH] (ref 7.3–7.4)
PO2 BLD: 26 MM HG (ref 35–45)
POTASSIUM BLD-SCNC: 3.7 MMOL/L (ref 3.5–5.3)
SAO2 % BLD FROM PO2: 48 % (ref 95–98)
SODIUM BLD-SCNC: 142 MMOL/L (ref 136–145)
SPECIMEN SOURCE: ABNORMAL

## 2018-12-22 PROCEDURE — 82947 ASSAY GLUCOSE BLOOD QUANT: CPT

## 2018-12-22 PROCEDURE — 96374 THER/PROPH/DIAG INJ IV PUSH: CPT

## 2018-12-22 PROCEDURE — 82330 ASSAY OF CALCIUM: CPT

## 2018-12-22 PROCEDURE — 84295 ASSAY OF SERUM SODIUM: CPT

## 2018-12-22 PROCEDURE — 74177 CT ABD & PELVIS W/CONTRAST: CPT

## 2018-12-22 PROCEDURE — 36415 COLL VENOUS BLD VENIPUNCTURE: CPT | Performed by: NURSE PRACTITIONER

## 2018-12-22 PROCEDURE — 84132 ASSAY OF SERUM POTASSIUM: CPT

## 2018-12-22 PROCEDURE — 99222 1ST HOSP IP/OBS MODERATE 55: CPT | Performed by: SURGERY

## 2018-12-22 PROCEDURE — 82803 BLOOD GASES ANY COMBINATION: CPT

## 2018-12-22 PROCEDURE — 85014 HEMATOCRIT: CPT

## 2018-12-22 PROCEDURE — 71260 CT THORAX DX C+: CPT

## 2018-12-22 PROCEDURE — 99285 EMERGENCY DEPT VISIT HI MDM: CPT

## 2018-12-22 PROCEDURE — 93005 ELECTROCARDIOGRAM TRACING: CPT

## 2018-12-22 PROCEDURE — 82553 CREATINE MB FRACTION: CPT | Performed by: NURSE PRACTITIONER

## 2018-12-22 PROCEDURE — 82550 ASSAY OF CK (CPK): CPT | Performed by: NURSE PRACTITIONER

## 2018-12-22 RX ORDER — OXYCODONE HYDROCHLORIDE 5 MG/1
5 TABLET ORAL EVERY 4 HOURS PRN
Status: DISCONTINUED | OUTPATIENT
Start: 2018-12-22 | End: 2018-12-24 | Stop reason: HOSPADM

## 2018-12-22 RX ORDER — SODIUM CHLORIDE 9 MG/ML
100 INJECTION, SOLUTION INTRAVENOUS CONTINUOUS
Status: DISCONTINUED | OUTPATIENT
Start: 2018-12-22 | End: 2018-12-23

## 2018-12-22 RX ORDER — HYDROXYCHLOROQUINE SULFATE 200 MG/1
200 TABLET, FILM COATED ORAL 2 TIMES DAILY WITH MEALS
COMMUNITY
End: 2019-01-16 | Stop reason: SDUPTHER

## 2018-12-22 RX ORDER — ACETAMINOPHEN 325 MG/1
975 TABLET ORAL EVERY 8 HOURS SCHEDULED
Status: DISCONTINUED | OUTPATIENT
Start: 2018-12-22 | End: 2018-12-24 | Stop reason: HOSPADM

## 2018-12-22 RX ORDER — HYDROXYCHLOROQUINE SULFATE 200 MG/1
200 TABLET, FILM COATED ORAL 2 TIMES DAILY WITH MEALS
Status: DISCONTINUED | OUTPATIENT
Start: 2018-12-22 | End: 2018-12-24 | Stop reason: HOSPADM

## 2018-12-22 RX ORDER — METHOCARBAMOL 500 MG/1
500 TABLET, FILM COATED ORAL EVERY 6 HOURS SCHEDULED
Status: DISCONTINUED | OUTPATIENT
Start: 2018-12-22 | End: 2018-12-24 | Stop reason: HOSPADM

## 2018-12-22 RX ADMIN — METHOCARBAMOL 500 MG: 500 TABLET, FILM COATED ORAL at 23:38

## 2018-12-22 RX ADMIN — METHOCARBAMOL 500 MG: 500 TABLET, FILM COATED ORAL at 18:00

## 2018-12-22 RX ADMIN — ACETAMINOPHEN 975 MG: 325 TABLET, FILM COATED ORAL at 18:00

## 2018-12-22 RX ADMIN — ACETAMINOPHEN 975 MG: 325 TABLET, FILM COATED ORAL at 23:38

## 2018-12-22 RX ADMIN — IOHEXOL 100 ML: 350 INJECTION, SOLUTION INTRAVENOUS at 16:08

## 2018-12-22 RX ADMIN — HYDROXYCHLOROQUINE SULFATE 200 MG: 200 TABLET, FILM COATED ORAL at 23:39

## 2018-12-22 RX ADMIN — SODIUM CHLORIDE 100 ML/HR: 0.9 INJECTION, SOLUTION INTRAVENOUS at 18:01

## 2018-12-22 RX ADMIN — SODIUM CHLORIDE 1000 ML: 0.9 INJECTION, SOLUTION INTRAVENOUS at 16:10

## 2018-12-22 NOTE — LETTER
[address] [place] [address] 9  31 [facility] [address] [address]  Dept: [phone]    December 24, 2018     Patient: Mauricio Sacnhez   YOB: 1965   Date of Visit: 12/22/2018       To Whom it May Concern:    Mauricio Sanchez is under my professional care  She was seen in the hospital from 12/22/2018   to 12/24/18  She will not be able to return to work until seen for re-evaluation on Tuesday, January 8, 2018 in the outpatient office       If you have any questions or concerns, please don't hesitate to call           Sincerely,          Bharat Arteaga
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
## 2018-12-22 NOTE — ED NOTES
Patient report called to floor  p-9 does not have a tele box at this time for patient awaiting one to be sent from calvin Egan RN  12/22/18 0828
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
## 2018-12-23 ENCOUNTER — APPOINTMENT (OUTPATIENT)
Dept: RADIOLOGY | Facility: HOSPITAL | Age: 53
End: 2018-12-23
Payer: COMMERCIAL

## 2018-12-23 PROBLEM — S80.10XA MULTIPLE LEG CONTUSIONS: Status: ACTIVE | Noted: 2018-12-23

## 2018-12-23 LAB
ANION GAP SERPL CALCULATED.3IONS-SCNC: 6 MMOL/L (ref 4–13)
BUN SERPL-MCNC: 14 MG/DL (ref 5–25)
CALCIUM SERPL-MCNC: 8.2 MG/DL (ref 8.3–10.1)
CHLORIDE SERPL-SCNC: 113 MMOL/L (ref 100–108)
CO2 SERPL-SCNC: 22 MMOL/L (ref 21–32)
CREAT SERPL-MCNC: 0.78 MG/DL (ref 0.6–1.3)
ERYTHROCYTE [DISTWIDTH] IN BLOOD BY AUTOMATED COUNT: 14.6 % (ref 11.6–15.1)
GFR SERPL CREATININE-BSD FRML MDRD: 86 ML/MIN/1.73SQ M
GLUCOSE SERPL-MCNC: 92 MG/DL (ref 65–140)
HCT VFR BLD AUTO: 38.8 % (ref 34.8–46.1)
HGB BLD-MCNC: 12.2 G/DL (ref 11.5–15.4)
MCH RBC QN AUTO: 27.7 PG (ref 26.8–34.3)
MCHC RBC AUTO-ENTMCNC: 31.4 G/DL (ref 31.4–37.4)
MCV RBC AUTO: 88 FL (ref 82–98)
PLATELET # BLD AUTO: 184 THOUSANDS/UL (ref 149–390)
PMV BLD AUTO: 10.1 FL (ref 8.9–12.7)
POTASSIUM SERPL-SCNC: 3.7 MMOL/L (ref 3.5–5.3)
RBC # BLD AUTO: 4.4 MILLION/UL (ref 3.81–5.12)
SODIUM SERPL-SCNC: 141 MMOL/L (ref 136–145)
WBC # BLD AUTO: 4.31 THOUSAND/UL (ref 4.31–10.16)

## 2018-12-23 PROCEDURE — 97166 OT EVAL MOD COMPLEX 45 MIN: CPT

## 2018-12-23 PROCEDURE — G8988 SELF CARE GOAL STATUS: HCPCS

## 2018-12-23 PROCEDURE — G8987 SELF CARE CURRENT STATUS: HCPCS

## 2018-12-23 PROCEDURE — G8989 SELF CARE D/C STATUS: HCPCS

## 2018-12-23 PROCEDURE — G8979 MOBILITY GOAL STATUS: HCPCS

## 2018-12-23 PROCEDURE — 85027 COMPLETE CBC AUTOMATED: CPT | Performed by: NURSE PRACTITIONER

## 2018-12-23 PROCEDURE — 97163 PT EVAL HIGH COMPLEX 45 MIN: CPT

## 2018-12-23 PROCEDURE — 99231 SBSQ HOSP IP/OBS SF/LOW 25: CPT | Performed by: SURGERY

## 2018-12-23 PROCEDURE — 73560 X-RAY EXAM OF KNEE 1 OR 2: CPT

## 2018-12-23 PROCEDURE — 73521 X-RAY EXAM HIPS BI 2 VIEWS: CPT

## 2018-12-23 PROCEDURE — 80048 BASIC METABOLIC PNL TOTAL CA: CPT | Performed by: NURSE PRACTITIONER

## 2018-12-23 PROCEDURE — G8978 MOBILITY CURRENT STATUS: HCPCS

## 2018-12-23 RX ORDER — LORAZEPAM 0.5 MG/1
0.5 TABLET ORAL EVERY 6 HOURS PRN
Status: DISCONTINUED | OUTPATIENT
Start: 2018-12-23 | End: 2018-12-24 | Stop reason: HOSPADM

## 2018-12-23 RX ADMIN — ACETAMINOPHEN 975 MG: 325 TABLET, FILM COATED ORAL at 06:12

## 2018-12-23 RX ADMIN — LORAZEPAM 0.5 MG: 0.5 TABLET ORAL at 23:20

## 2018-12-23 RX ADMIN — METHOCARBAMOL 500 MG: 500 TABLET, FILM COATED ORAL at 06:12

## 2018-12-23 RX ADMIN — HYDROXYCHLOROQUINE SULFATE 200 MG: 200 TABLET, FILM COATED ORAL at 08:26

## 2018-12-23 RX ADMIN — METHOCARBAMOL 500 MG: 500 TABLET, FILM COATED ORAL at 11:39

## 2018-12-23 RX ADMIN — LORAZEPAM 0.5 MG: 0.5 TABLET ORAL at 08:26

## 2018-12-23 RX ADMIN — METHOCARBAMOL 500 MG: 500 TABLET, FILM COATED ORAL at 23:19

## 2018-12-23 RX ADMIN — HYDROXYCHLOROQUINE SULFATE 200 MG: 200 TABLET, FILM COATED ORAL at 17:03

## 2018-12-23 RX ADMIN — METHOCARBAMOL 500 MG: 500 TABLET, FILM COATED ORAL at 17:03

## 2018-12-23 RX ADMIN — ACETAMINOPHEN 975 MG: 325 TABLET, FILM COATED ORAL at 13:44

## 2018-12-23 RX ADMIN — ACETAMINOPHEN 975 MG: 325 TABLET, FILM COATED ORAL at 23:18

## 2018-12-23 NOTE — UTILIZATION REVIEW
This is a Notification of Inpatient Admission/Request for Inpatient Authorization for our facility [facility]  Be advised that this patient was admitted to our facility under Observation Status  Please contact the Utilization Review Department where the patient is receiving care services for additional admission information  Place of Service Code: 25  Place of Service Name: [facility]  CPT Code for Observation:     Presentation Date & Time: 12/22/2018  3:56 PM  Observation Admission Date & Time: 12/22 431PM  Hours in Observation: Currently 15Hrs  Discharge Date & Time: No discharge date for patient encounter  Discharge Disposition (if discharged): Final discharge disposition not confirmed  Attending Physician:  Bria Mckeon  Specialty- General Surgery  Medicare Number-   National Practioner ID- [id number]  Primary Office:  [address] [place] [address]  Phone 1: [phone]  Fax: [phone]  Admission Orders     Ordered        12/22/18 1631  Place in Observation  Once               Facility: [address])  Address: [address] [place] [address]  Phone: [phone] Tax ID: [id number]  NPI: [id number]  Medicare ID: [id number]  [address] Utilization Review Department  Phone: [phone]; Fax [phone]  ATTENTION: Please call with any questions or concerns to [phone]  and carefully listen to the prompts so that you are directed to the right person  Send all requests for admission clinical reviews, approved or denied determinations and any other requests to fax [phone]   All voicemails are confidential
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
## 2018-12-24 VITALS
HEART RATE: 77 BPM | SYSTOLIC BLOOD PRESSURE: 115 MMHG | OXYGEN SATURATION: 97 % | WEIGHT: 190 LBS | BODY MASS INDEX: 37.3 KG/M2 | TEMPERATURE: 98.6 F | HEIGHT: 60 IN | DIASTOLIC BLOOD PRESSURE: 75 MMHG | RESPIRATION RATE: 16 BRPM

## 2018-12-24 LAB
ATRIAL RATE: 100 BPM
P AXIS: 46 DEGREES
PR INTERVAL: 194 MS
QRS AXIS: 67 DEGREES
QRSD INTERVAL: 78 MS
QT INTERVAL: 352 MS
QTC INTERVAL: 454 MS
T WAVE AXIS: 33 DEGREES
VENTRICULAR RATE: 100 BPM

## 2018-12-24 PROCEDURE — 93010 ELECTROCARDIOGRAM REPORT: CPT | Performed by: INTERNAL MEDICINE

## 2018-12-24 PROCEDURE — 99238 HOSP IP/OBS DSCHRG MGMT 30/<: CPT | Performed by: SURGERY

## 2018-12-24 RX ORDER — ACETAMINOPHEN 325 MG/1
975 TABLET ORAL EVERY 8 HOURS
Qty: 30 TABLET | Refills: 0 | Status: SHIPPED | OUTPATIENT
Start: 2018-12-24

## 2018-12-24 RX ORDER — METHOCARBAMOL 500 MG/1
500 TABLET, FILM COATED ORAL EVERY 6 HOURS SCHEDULED
Qty: 45 TABLET | Refills: 0 | Status: SHIPPED | OUTPATIENT
Start: 2018-12-24 | End: 2018-12-28 | Stop reason: SDUPTHER

## 2018-12-24 RX ORDER — LORAZEPAM 0.5 MG/1
0.5 TABLET ORAL EVERY 6 HOURS PRN
Qty: 15 TABLET | Refills: 0 | Status: SHIPPED | OUTPATIENT
Start: 2018-12-24 | End: 2018-12-28 | Stop reason: SDUPTHER

## 2018-12-24 RX ORDER — OXYCODONE HYDROCHLORIDE 5 MG/1
5 TABLET ORAL EVERY 4 HOURS PRN
Qty: 20 TABLET | Refills: 0 | Status: SHIPPED | OUTPATIENT
Start: 2018-12-24 | End: 2018-12-28 | Stop reason: ALTCHOICE

## 2018-12-24 RX ADMIN — HYDROXYCHLOROQUINE SULFATE 200 MG: 200 TABLET, FILM COATED ORAL at 09:01

## 2018-12-24 RX ADMIN — ACETAMINOPHEN 975 MG: 325 TABLET, FILM COATED ORAL at 05:56

## 2018-12-24 RX ADMIN — METHOCARBAMOL 500 MG: 500 TABLET, FILM COATED ORAL at 05:56

## 2018-12-24 RX ADMIN — ENOXAPARIN SODIUM 40 MG: 40 INJECTION SUBCUTANEOUS at 09:01

## 2018-12-24 NOTE — PLAN OF CARE
DISCHARGE PLANNING     Discharge to home or other facility with appropriate resources Progressing        DISCHARGE PLANNING - CARE MANAGEMENT     Discharge to post-acute care or home with appropriate resources Progressing        PAIN - ADULT     Verbalizes/displays adequate comfort level or baseline comfort level Progressing        SAFETY ADULT     Patient will remain free of falls Progressing     Maintain or return to baseline ADL function Progressing     Maintain or return mobility status to optimal level Progressing
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
## 2018-12-24 NOTE — PROGRESS NOTES
Progress Note - Fernando Oropeza 1965, 46 y o  female MRN: [id number]    Unit/Bed#: [facility] 925-01 Encounter: [id number]    Primary Care Provider: Courtney Martini MD   Date and time admitted to hospital: 12/22/2018  3:56 PM        * Multiple leg contusions   Assessment & Plan    B/L knee x-rays and pelvic x-ray  Ambulating  No obvious deformities, scans reviewed and negative  Pain controlled  Moving about in room, ready for discharge             Subjective: I feel so much better today  The chest tightness/anxiety so much better    Objective: Comfortable sitting in bed and eating breakfast    Meds/Allergies   Prescriptions Prior to Admission   Medication Sig Dispense Refill Last Dose    hydroxychloroquine (PLAQUENIL) 200 mg tablet Take 200 mg by mouth 2 (two) times a day with meals          Vitals: Blood pressure 115/75, pulse 77, temperature 98 6 °F (37 °C), temperature source Oral, resp  rate 16, height 5' (1 524 m), weight 86 2 kg (190 lb), SpO2 97 %  Body mass index is 37 11 kg/m²       ABG: No results found for: PHART, FQQ4HWL, PO2ART, HBI7MIT, R0WROJQP, BEART, SOURCE      Intake/Output Summary (Last 24 hours) at 12/24/18 0841  Last data filed at 12/24/18 0640   Gross per 24 hour   Intake              960 ml   Output                0 ml   Net              960 ml       Invasive Devices     Peripheral Intravenous Line            Peripheral IV 12/22/18 Right Antecubital 1 day                Nutrition/GI Proph/Bowel Reg: regular    Physical Exam:   GENERAL APPEARANCE: much improved, comfortable  HEENT: EOM's intact  CV: RRR, no complaints of chest discomfort  LUNGS: CTA bilaterally, no shortness of breath noted  ABD: soft and round, non-tender, tolerating a regular diet  EXT: moving all four extremities, ambulating independently  NEURO: intact, GCS - 15  SKIN: warm and dry      Lab Results: none  Imaging/EKG Studies: none  Other Studies: none  VTE Prophylaxis: SCd's, lovenox      Nurse / Provider rounds completed with staff nurse, Arturo Martinez, will discharge patient home today with follow up in 2-3 weeks in outpatient office  Anxiety much improved, no nightmares

## 2018-12-24 NOTE — ASSESSMENT & PLAN NOTE
B/L knee x-rays and pelvic x-ray  Ambulating  No obvious deformities, scans reviewed and negative  Pain controlled  Moving about in room, ready for discharge

## 2018-12-24 NOTE — NURSING NOTE
Reviewed discharge instructions with patient re: medications, diet, activity limits, follow up appointments  Patient indicates understanding of same

## 2018-12-24 NOTE — DISCHARGE INSTRUCTIONS
Rest  Continue to ambulate  Contusion in Adults   WHAT YOU NEED TO KNOW:   A contusion is a bruise that appears on your skin after an injury  A bruise happens when small blood vessels tear but skin does not  When blood vessels tear, blood leaks into nearby tissue, such as soft tissue or muscle  DISCHARGE INSTRUCTIONS:   Return to the emergency department if:   · You have new trouble moving the injured area  · You have tingling or numbness in or near the injured area  · Your hand or foot below the bruise gets cold or turns pale  Contact your healthcare provider if:   · You find a new lump in the injured area  · Your symptoms do not improve with treatment after 4 to 5 days  · You have questions or concerns about your condition or care  Medicines: You may need any of the following:  · NSAIDs  help decrease swelling and pain or fever  This medicine is available with or without a doctor's order  NSAIDs can cause stomach bleeding or kidney problems in certain people  If you take blood thinner medicine, always ask your healthcare provider if NSAIDs are safe for you  Always read the medicine label and follow directions  · Prescription pain medicine  may be given  Do not wait until the pain is severe before you take your medicine  · Take your medicine as directed  Contact your healthcare provider if you think your medicine is not helping or if you have side effects  Tell him of her if you are allergic to any medicine  Keep a list of the medicines, vitamins, and herbs you take  Include the amounts, and when and why you take them  Bring the list or the pill bottles to follow-up visits  Carry your medicine list with you in case of an emergency  Follow up with your healthcare provider as directed: You may need to return within a week to check your injury again  Write down your questions so you remember to ask them during your visits    Help a contusion heal:   · Rest the injured area  or use it less than usual  If you bruised your leg or foot, you may need crutches or a cane to help you walk  This will help you keep weight off your injured body part  · Apply ice  to decrease swelling and pain  Ice may also help prevent tissue damage  Use an ice pack, or put crushed ice in a plastic bag  Cover it with a towel and place it on your bruise for 15 to 20 minutes every hour or as directed  · Use compression  to support the area and decrease swelling  Wrap an elastic bandage around the area over the bruised muscle  Make sure the bandage is not too tight  You should be able to fit 1 finger between the bandage and your skin  · Elevate (raise) your injured body part  above the level of your heart to help decrease pain and swelling  Use pillows, blankets, or rolled towels to elevate the area as often as you can  · Do not drink alcohol  as directed  Alcohol may slow healing  · Do not stretch injured muscles  right after your injury  Ask your healthcare provider when and how you may safely stretch after your injury  Gentle stretches can help increase your flexibility  · Do not massage the area or put heating pads  on the bruise right after your injury  Heat and massage may slow healing  Your healthcare provider may tell you to apply heat after several days  At that time, heat will start to help the injury heal   Prevent another contusion:   · Stretch and warm up before you play sports or exercise  · Wear protective gear when you play sports  Examples are shin guards and padding  · If you begin a new physical activity, start slowly to give your body a chance to adjust   © 2017 [employer] Information is for End User's use only and may not be sold, redistributed or otherwise used for commercial purposes  All illustrations and images included in CareNotes® are the copyrighted property of [employer] , [employer]  or [employer]  The above information is an  only   It is not intended as medical advice for individual conditions or treatments  Talk to your doctor, nurse or pharmacist before following any medical regimen to see if it is safe and effective for you

## 2018-12-26 ENCOUNTER — TELEPHONE (OUTPATIENT)
Dept: FAMILY MEDICINE CLINIC | Facility: CLINIC | Age: 53
End: 2018-12-26
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
## 2018-12-28 ENCOUNTER — OFFICE VISIT (OUTPATIENT)
Dept: FAMILY MEDICINE CLINIC | Facility: CLINIC | Age: 53
End: 2018-12-28
Payer: COMMERCIAL

## 2018-12-28 VITALS
HEART RATE: 80 BPM | DIASTOLIC BLOOD PRESSURE: 70 MMHG | SYSTOLIC BLOOD PRESSURE: 120 MMHG | RESPIRATION RATE: 12 BRPM | HEIGHT: 60 IN | OXYGEN SATURATION: 99 % | BODY MASS INDEX: 39.46 KG/M2 | WEIGHT: 201 LBS

## 2018-12-28 DIAGNOSIS — S80.10XA CONTUSION OF MULTIPLE SITES OF LOWER EXTREMITY, UNSPECIFIED LATERALITY, INITIAL ENCOUNTER: ICD-10-CM
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
## 2018-12-28 PROBLEM — F41.8 SITUATIONAL ANXIETY: Status: ACTIVE | Noted: 2018-12-28

## 2018-12-28 PROCEDURE — 99214 OFFICE O/P EST MOD 30 MIN: CPT | Performed by: NURSE PRACTITIONER

## 2018-12-28 RX ORDER — LORAZEPAM 0.5 MG/1
0.5 TABLET ORAL EVERY 6 HOURS PRN
Qty: 30 TABLET | Refills: 0 | OUTPATIENT
Start: 2018-12-28 | End: 2019-01-16 | Stop reason: SDUPTHER

## 2018-12-28 RX ORDER — METHOCARBAMOL 500 MG/1
500 TABLET, FILM COATED ORAL EVERY 6 HOURS SCHEDULED
Qty: 45 TABLET | Refills: 0 | Status: SHIPPED | OUTPATIENT
Start: 2018-12-28 | End: 2018-12-28 | Stop reason: SDUPTHER

## 2018-12-28 RX ORDER — METHOCARBAMOL 500 MG/1
500 TABLET, FILM COATED ORAL EVERY 6 HOURS SCHEDULED
Qty: 45 TABLET | Refills: 0 | Status: SHIPPED | OUTPATIENT
Start: 2018-12-28 | End: 2019-04-02 | Stop reason: ALTCHOICE

## 2019-01-15 ENCOUNTER — OFFICE VISIT (OUTPATIENT)
Dept: SURGERY | Facility: CLINIC | Age: 54
End: 2019-01-15
Payer: COMMERCIAL

## 2019-01-15 VITALS
SYSTOLIC BLOOD PRESSURE: 118 MMHG | DIASTOLIC BLOOD PRESSURE: 72 MMHG | HEIGHT: 60 IN | BODY MASS INDEX: 39.3 KG/M2 | WEIGHT: 200.2 LBS | TEMPERATURE: 97.7 F

## 2019-01-15 DIAGNOSIS — M25.511 ACUTE PAIN OF RIGHT SHOULDER: Primary | ICD-10-CM

## 2019-01-15 DIAGNOSIS — F41.9 ANXIETY: ICD-10-CM

## 2019-01-15 DIAGNOSIS — R20.2 PARESTHESIAS: ICD-10-CM

## 2019-01-15 DIAGNOSIS — S80.10XA CONTUSION OF MULTIPLE SITES OF LOWER EXTREMITY, UNSPECIFIED LATERALITY, INITIAL ENCOUNTER: ICD-10-CM

## 2019-01-15 PROCEDURE — 99213 OFFICE O/P EST LOW 20 MIN: CPT | Performed by: PHYSICIAN ASSISTANT

## 2019-01-15 RX ORDER — GABAPENTIN 100 MG/1
100 CAPSULE ORAL 2 TIMES DAILY
Qty: 60 CAPSULE | Refills: 0 | Status: SHIPPED | OUTPATIENT
Start: 2019-01-15 | End: 2019-04-02 | Stop reason: SDUPTHER
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
## 2019-01-15 NOTE — ASSESSMENT & PLAN NOTE
- of inner left lower extremity from degloving type injury isolated to that area  - start gabapentin 100 mg BID for paresthesias  - f/u with family doctor
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
## 2019-01-16 ENCOUNTER — OFFICE VISIT (OUTPATIENT)
Dept: FAMILY MEDICINE CLINIC | Facility: CLINIC | Age: 54
End: 2019-01-16
Payer: COMMERCIAL

## 2019-01-16 VITALS
DIASTOLIC BLOOD PRESSURE: 74 MMHG | OXYGEN SATURATION: 97 % | SYSTOLIC BLOOD PRESSURE: 122 MMHG | HEART RATE: 71 BPM | BODY MASS INDEX: 39.27 KG/M2 | RESPIRATION RATE: 15 BRPM | WEIGHT: 200 LBS | TEMPERATURE: 98 F | HEIGHT: 60 IN

## 2019-01-16 DIAGNOSIS — S80.12XD CONTUSION OF MULTIPLE SITES OF LEFT LOWER EXTREMITY, SUBSEQUENT ENCOUNTER: Primary | ICD-10-CM

## 2019-01-16 DIAGNOSIS — E55.9 VITAMIN D DEFICIENCY: ICD-10-CM

## 2019-01-16 DIAGNOSIS — IMO0002 CHRONIC MIGRAINE: ICD-10-CM

## 2019-01-16 DIAGNOSIS — M32.9 SYSTEMIC LUPUS ERYTHEMATOSUS, UNSPECIFIED SLE TYPE, UNSPECIFIED ORGAN INVOLVEMENT STATUS (HCC): ICD-10-CM

## 2019-01-16 DIAGNOSIS — R20.2 PARESTHESIAS: ICD-10-CM

## 2019-01-16 DIAGNOSIS — F41.8 SITUATIONAL ANXIETY: ICD-10-CM

## 2019-01-16 DIAGNOSIS — Z13.220 LIPID SCREENING: ICD-10-CM

## 2019-01-16 PROCEDURE — 99214 OFFICE O/P EST MOD 30 MIN: CPT | Performed by: FAMILY MEDICINE

## 2019-01-16 RX ORDER — LORAZEPAM 0.5 MG/1
0.5 TABLET ORAL 2 TIMES DAILY
Qty: 30 TABLET | Refills: 0 | Status: SHIPPED | OUTPATIENT
Start: 2019-01-16 | End: 2019-02-15

## 2019-01-16 RX ORDER — OXYCODONE HYDROCHLORIDE AND ACETAMINOPHEN 5; 325 MG/1; MG/1
1 TABLET ORAL 2 TIMES DAILY PRN
Qty: 20 TABLET | Refills: 0 | Status: SHIPPED | OUTPATIENT
Start: 2019-01-16
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
## 2019-02-22 ENCOUNTER — ANNUAL EXAM (OUTPATIENT)
Dept: FAMILY MEDICINE CLINIC | Facility: CLINIC | Age: 54
End: 2019-02-22
Payer: COMMERCIAL

## 2019-02-22 VITALS
TEMPERATURE: 97.6 F | OXYGEN SATURATION: 98 % | SYSTOLIC BLOOD PRESSURE: 118 MMHG | RESPIRATION RATE: 16 BRPM | WEIGHT: 203 LBS | HEIGHT: 60 IN | BODY MASS INDEX: 39.85 KG/M2 | DIASTOLIC BLOOD PRESSURE: 78 MMHG | HEART RATE: 88 BPM

## 2019-02-22 DIAGNOSIS — M25.511 ACUTE PAIN OF RIGHT SHOULDER: ICD-10-CM

## 2019-02-22 DIAGNOSIS — Z12.31 VISIT FOR SCREENING MAMMOGRAM: ICD-10-CM

## 2019-02-22 DIAGNOSIS — Z12.4 PAP SMEAR FOR CERVICAL CANCER SCREENING: Primary | ICD-10-CM

## 2019-02-22 PROCEDURE — G0145 SCR C/V CYTO,THINLAYER,RESCR: HCPCS | Performed by: FAMILY MEDICINE

## 2019-02-22 PROCEDURE — S0612 ANNUAL GYNECOLOGICAL EXAMINA: HCPCS | Performed by: FAMILY MEDICINE

## 2019-02-22 RX ORDER — AZATHIOPRINE 50 MG/1
50 TABLET ORAL
COMMUNITY
Start: 2016-10-20

## 2019-02-22 NOTE — PROGRESS NOTES
Subjective     Olga Huizar is a 46 y  o  woman who comes in today for a  pap smear only  Her most recent annual exam was on 12 years ago  Her most recent Pap smear was on 12 years ago and showed no abnormalities  Previous abnormal Pap smears: no  Contraception: none     Had hysterectomy 14 years ago  Denies hot flashes and mood swing       The following portions of the patient's history were reviewed and updated as appropriate: allergies, current medications, past family history, past medical history, past social history, past surgical history and problem list     Review of Systems  Constitutional: negative  Eyes: negative  Ears, nose, mouth, throat, and face: negative  Respiratory: negative  Cardiovascular: negative  Gastrointestinal: negative  Genitourinary:negative  Integument/breast: negative  Hematologic/lymphatic: negative  Musculoskeletal:negative  Neurological: negative  Behavioral/Psych: negative  Endocrine: negative  Allergic/Immunologic: negative  Objective     /78 (BP Location: Left arm, Patient Position: Sitting, Cuff Size: Standard)   Pulse 88   Temp 97 6 °F (36 4 °C)   Resp 16   Ht 5' (1 524 m)   Wt 92 1 kg (203 lb)   SpO2 98%   BMI 39 65 kg/m²      Physical Exam   Constitutional: She is oriented to person, place, and time  She appears well-developed and well-nourished  HENT:   Head: Normocephalic and atraumatic  Mouth/Throat: No oropharyngeal exudate  Eyes: Pupils are equal, round, and reactive to light  EOM are normal    Neck: Normal range of motion  Neck supple  Cardiovascular: Normal rate and regular rhythm  Pulmonary/Chest: Effort normal and breath sounds normal    Abdominal: Soft  Bowel sounds are normal    Musculoskeletal: Normal range of motion  Neurological: She is alert and oriented to person, place, and time  No cranial nerve deficit  Coordination normal    Skin: No rash noted  No erythema  Pelvic Exam:   Pap smear obtained  Normal vaginal fold   Absent cervix and uterus   Normal adnexa     Assessment/Plan   Zi Mitchell was seen today for abnormal pap smear  Diagnoses and all orders for this visit:    Pap smear for cervical cancer screening  -     Liquid-based pap, screening    Visit for screening mammogram    Acute pain of right shoulder  -     Ambulatory referral to Physical Therapy      Screening vaginal pap smear  Follow up in 5 year, or as indicated by Pap results

## 2019-03-01 LAB
LAB AP GYN PRIMARY INTERPRETATION: NORMAL
Lab: NORMAL

## 2019-03-18 DIAGNOSIS — R60.0 LOCALIZED EDEMA: ICD-10-CM

## 2019-03-18 RX ORDER — HYDROCHLOROTHIAZIDE 12.5 MG/1
CAPSULE, GELATIN COATED ORAL
Qty: 90 CAPSULE | Refills: 0 | Status: SHIPPED | OUTPATIENT
Start: 2019-03-18

## 2019-04-02 ENCOUNTER — OFFICE VISIT (OUTPATIENT)
Dept: FAMILY MEDICINE CLINIC | Facility: CLINIC | Age: 54
End: 2019-04-02

## 2019-04-02 VITALS
WEIGHT: 206 LBS | SYSTOLIC BLOOD PRESSURE: 118 MMHG | OXYGEN SATURATION: 99 % | BODY MASS INDEX: 40.44 KG/M2 | TEMPERATURE: 98.6 F | DIASTOLIC BLOOD PRESSURE: 74 MMHG | HEART RATE: 78 BPM | HEIGHT: 60 IN | RESPIRATION RATE: 16 BRPM

## 2019-04-02 DIAGNOSIS — M79.605 LEFT LEG PAIN: Primary | ICD-10-CM

## 2019-04-02 DIAGNOSIS — R20.2 PARESTHESIAS: ICD-10-CM

## 2019-04-02 PROCEDURE — 99213 OFFICE O/P EST LOW 20 MIN: CPT | Performed by: FAMILY MEDICINE

## 2019-04-02 RX ORDER — GABAPENTIN 100 MG/1
100 CAPSULE ORAL 2 TIMES DAILY
Qty: 60 CAPSULE | Refills: 0 | Status: SHIPPED | OUTPATIENT
Start: 2019-04-02 | End: 2019-04-02 | Stop reason: SDUPTHER

## 2019-04-02 RX ORDER — GABAPENTIN 100 MG/1
100 CAPSULE ORAL 2 TIMES DAILY
Qty: 60 CAPSULE | Refills: 0 | Status: SHIPPED | OUTPATIENT
Start: 2019-04-02

## 2019-04-02 RX ORDER — POTASSIUM CHLORIDE 750 MG/1
10 CAPSULE, EXTENDED RELEASE ORAL 2 TIMES DAILY
Qty: 30 CAPSULE | Refills: 0 | Status: SHIPPED | OUTPATIENT
Start: 2019-04-02
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
## 2019-05-16 ENCOUNTER — TELEPHONE (OUTPATIENT)
Dept: FAMILY MEDICINE CLINIC | Facility: CLINIC | Age: 54
End: 2019-05-16

## 2019-05-24 ENCOUNTER — TELEPHONE (OUTPATIENT)
Dept: FAMILY MEDICINE CLINIC | Facility: CLINIC | Age: 54
End: 2019-05-24

## 2019-05-24 RX ORDER — HYDROXYZINE HYDROCHLORIDE 25 MG/1
25 TABLET, FILM COATED ORAL EVERY 8 HOURS PRN
COMMUNITY
Start: 2019-05-17

## 2019-08-15 DIAGNOSIS — J01.00 ACUTE MAXILLARY SINUSITIS, RECURRENCE NOT SPECIFIED: Primary | ICD-10-CM

## 2019-08-15 RX ORDER — AZITHROMYCIN 250 MG/1
TABLET, FILM COATED ORAL
Qty: 6 TABLET | Refills: 0 | Status: SHIPPED | OUTPATIENT
Start: 2019-08-15 | End: 2019-08-20

## 2025-06-02 NOTE — MISCELLANEOUS
6/2LMOVM asking to call back and reschedule appt due to change in doctor's schedule   Message  Return to work or school:   Aminata Chavez is under my professional care  She was seen in my office on October 11, 2016   She is able to return to work on  October 12, 2016      No restrictions          Signatures   Electronically signed by : WALLACE Toro; Oct 11 2016 10:04AM EST                       (Author)